# Patient Record
Sex: FEMALE | Race: WHITE | NOT HISPANIC OR LATINO | Employment: UNEMPLOYED | URBAN - METROPOLITAN AREA
[De-identification: names, ages, dates, MRNs, and addresses within clinical notes are randomized per-mention and may not be internally consistent; named-entity substitution may affect disease eponyms.]

---

## 2018-09-28 ENCOUNTER — OFFICE VISIT CONVERTED (OUTPATIENT)
Dept: UROLOGY | Facility: CLINIC | Age: 53
End: 2018-09-28
Attending: NURSE PRACTITIONER

## 2018-10-19 ENCOUNTER — OFFICE VISIT CONVERTED (OUTPATIENT)
Dept: UROLOGY | Facility: CLINIC | Age: 53
End: 2018-10-19
Attending: UROLOGY

## 2019-10-30 ENCOUNTER — HOSPITAL ENCOUNTER (OUTPATIENT)
Dept: SLEEP MEDICINE | Facility: HOSPITAL | Age: 54
Discharge: HOME | End: 2019-10-30
Attending: PSYCHIATRY & NEUROLOGY

## 2021-05-16 VITALS
BODY MASS INDEX: 35.85 KG/M2 | TEMPERATURE: 98.3 F | DIASTOLIC BLOOD PRESSURE: 84 MMHG | HEIGHT: 64 IN | HEART RATE: 81 BPM | SYSTOLIC BLOOD PRESSURE: 153 MMHG | WEIGHT: 210 LBS

## 2021-05-16 VITALS
DIASTOLIC BLOOD PRESSURE: 81 MMHG | BODY MASS INDEX: 38.41 KG/M2 | SYSTOLIC BLOOD PRESSURE: 136 MMHG | HEIGHT: 64 IN | HEART RATE: 86 BPM | TEMPERATURE: 98.4 F | WEIGHT: 225 LBS

## 2021-05-24 ENCOUNTER — CONVERSION ENCOUNTER (OUTPATIENT)
Dept: UROLOGY | Facility: CLINIC | Age: 56
End: 2021-05-24

## 2021-05-24 ENCOUNTER — OFFICE VISIT CONVERTED (OUTPATIENT)
Dept: UROLOGY | Facility: CLINIC | Age: 56
End: 2021-05-24
Attending: NURSE PRACTITIONER

## 2021-05-24 ENCOUNTER — HOSPITAL ENCOUNTER (OUTPATIENT)
Dept: UROLOGY | Facility: CLINIC | Age: 56
Discharge: HOME | End: 2021-05-24
Attending: NURSE PRACTITIONER

## 2021-05-24 LAB
BILIRUB UR QL STRIP: NEGATIVE
COLOR UR: YELLOW
CONV BACTERIA IN URINE MICRO: 0
CONV CALCIUM OXALATE CRYSTALS /HPF IN URINE SEDIMENT BY MICROSCOPY: NORMAL
CONV CLARITY OF URINE: CLEAR
CONV PROTEIN IN URINE BY AUTOMATED TEST STRIP: NEGATIVE
CONV UROBILINOGEN IN URINE BY AUTOMATED TEST STRIP: NORMAL
GLUCOSE UR QL: NEGATIVE
HGB UR QL STRIP: NORMAL
KETONES UR QL STRIP: NEGATIVE
LEUKOCYTE ESTERASE UR QL STRIP: NEGATIVE
NITRITE UR QL STRIP: NEGATIVE
PH UR STRIP.AUTO: 5 [PH]
RBC #/AREA URNS HPF: 0 /[HPF]
RENAL EPI CELLS #/AREA URNS HPF: 0 /[HPF]
SP GR UR: 1.01
SQUAMOUS SPT QL MICRO: 0
WBC #/AREA URNS HPF: 0 /[HPF]

## 2021-05-26 LAB — BACTERIA UR CULT: NORMAL

## 2021-06-05 NOTE — PROGRESS NOTES
Progress Note      Patient Name: Marii Schuster   Patient ID: [id number]   Sex: Female   YOB: 1965        Visit Date: May 24, 2021    Provider: DAMIEN Alcocer   Location: [facility]   Location Address: [address] [place] [address], [address] [place] [address]  [id number]   Location Phone: [phone]          Chief Complaint  · urethral discomfort and supra pubic pain over weekend      History Of Present Illness  The patient is a 56 year old female , who was last seen office 2018. She was diagnosed with urethral prolapse and meatal stenosis. Recommend cysto with urethral dilatation and she declined. States sometimes when she bends forward will feel pain in the vaginal area. She is due to begin her menstrual cycle. Did notice more discomfort after camping and was consuming large amount of soft drinks. It has subsided since then.       Past Medical History  Hyperlipidemia; Hypertension; Hypothyroidism; Renal stone         Past Surgical History  Gallbladder removal; Kidney Stone Surgery, Unspecified         Medication List  Fish Oil 1,000 mg (120 mg-180 mg) oral capsule; hydrochlorothiazide 12.5 mg oral capsule; levothyroxine 50 mcg oral tablet; losartan-hydrochlorothiazide 50-12.5 mg oral tablet; nystatin 100,000 unit/gram topical cream; Pepcid 20 mg oral tablet         Allergy List  Codeine Sulfate; Egg allergy         Family Medical History  DM Type II; Heart Disease; Colon Cancer         Social History  Alcohol (Former); Tobacco (Former)         Review of Systems  · Constitutional  o Denies  o : fever, headache, chills  · Eyes  o Denies  o : eye pain, double vision, blurred vision  · HENT  o Denies  o : sinus problems, sore throat, ear infection  · Cardiovascular  o Denies  o : chest pain, high blood pressure,   · Respiratory  o Denies  o : shortness of breath, wheezing, frequent cough  · Gastrointestinal  o Denies  o : nausea, vomiting, heartburn, indigestion, abdominal  pain  · Genitourinary  o Denies  o : urgency, frequency, urinary retention,   · Integument  o * See HPI  · Neurologic  o Denies  o : tingling or numbness, tremors, dizzy spells  · Musculoskeletal  o Denies  o : joint pain, neck pain, back pain  · Endocrine  o Denies  o : cold intolerance, heat intolerance, tired, excessive thirst, sluggish  · Psychiatric  o Admits  o : feels satisfied with life  o Denies  o : severe depression, concerns with hurting themselves  · Heme-Lymph  o Denies  o : swollen glands, blood clotting problems  · Allergic-Immunologic  o Denies  o : sinus allergy symptoms, hay fever      Vitals  Date Time BP Position Site L\R Cuff Size HR RR TEMP (F) WT  HT  BMI kg/m2 BSA m2 O2 Sat FR L/min FiO2        05/24/2021 03:49 /75 Sitting    104 - R  98                 Physical Examination  · Constitutional  o Appearance  o : Well nourished, well developed patient in no acute distress. Ambulating without difficulty.  · Respiratory  o Respiratory Effort  o : breathing unlabored  o Inspection of Chest  o : normal appearance, no retractions  o Auscultation of Lungs  o : normal breath sounds throughout  · Cardiovascular  o Heart  o :   § Auscultation of Heart  § : regular rate and rhythm, no murmurs, gallops or rubs  o Peripheral Vascular System  o : No abnormalities  · Gastrointestinal  o Abdominal Examination  o : Scaphoid abdomen which is non-tender to palpation with normal tone and without rigidity or guarding. Normal bowel sounds. No masses present.  o Liver and spleen  o : no abnormalities  o Hernias  o : absent   · Genitourinary  o External Genitalia  o : erythema with small amount of discharge   o Urethra  o : mildly inflamed, mild urethrocele.   o Bladder  o : Non-tender to palpation without distension.  o Cervix  o : nontender to palpation, no bleeding present  o Uterus  o : nontender to palpation  o Adnexa  o : No adnexal tenderness present, no adnexal masses present, ovaries not  palpable  · Lymphatic  o Groin  o : No lymphadenopathy present  · Skin and Subcutaneous Tissue  o General Inspection  o : no lesions present, no areas of discoloration, skin turgor normal, texture normal  · Neurologic  o Mental Status Examination  o : grossly oriented to person, place and time  o Gait and Station  o : normal gait, able to stand without difficulty  · Psychiatric  o Mood and Affect  o : mood normal, affect appropriate          Results  · In-Office Procedures  o Lab procedure  § Automated dipstick urinalysis with microscopy (02702)   § Color Ur: Yellow   § Clarity Ur: Clear   § Glucose Ur Ql Strip: Negative   § Bilirub Ur Ql Strip: Negative   § Ketones Ur Ql Strip: Negative   § Sp Gr Ur Qn: 1.010   § Hgb Ur Ql Strip: Trace-Lysed   § pH Ur-LsCnc: 5.0   § Prot Ur Ql Strip: Negative   § Urobilinogen Ur Strip-mCnc: 0.2 E.U./dL   § Nitrite Ur Ql Strip: Negative   § WBC Est Ur Ql Strip: Negative   § RBC UrnS Qn HPF: 0   § WBC UrnS Qn HPF: 0   § Bacteria UrnS Qn HPF: 0   § Crystals UrnS Qn HPF: amorphous sediment   § Epithelial Cells (non renal): 0 /HPF  § Epithelial Cells (renal): 0       Assessment  · Vaginitis     616.10/N76.0  · UTI (urinary tract infection)     599.0/N39.0  · 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 patient has some erythema and scant white discharge inner labia and mild inflammation with small urethral prolapse. to apply  nystatin cream as instructed and Diflucan x 2 days. pyridium prn and recommend stop consuming carbonated soft drinks and caffeine. increase water.  provided with printed rx pea size estradiol cream prn to meatus and recommend cysto if does not improve to evaluate for ic and urethral stricture. she may also need urethral dilatation. routine visit 6 months. If no improvement, follow up 3 weeks to recheck and schedule cysto.             Electronically Signed by: DAMIEN Alcocer -Author on May 24, 2021 05:28:54 PM

## 2021-07-15 VITALS — DIASTOLIC BLOOD PRESSURE: 75 MMHG | HEART RATE: 104 BPM | TEMPERATURE: 98 F | SYSTOLIC BLOOD PRESSURE: 142 MMHG

## 2021-08-23 ENCOUNTER — APPOINTMENT (OUTPATIENT)
Dept: CT IMAGING | Facility: HOSPITAL | Age: 56
End: 2021-08-23

## 2021-08-23 ENCOUNTER — APPOINTMENT (OUTPATIENT)
Dept: GENERAL RADIOLOGY | Facility: HOSPITAL | Age: 56
End: 2021-08-23

## 2021-08-23 ENCOUNTER — HOSPITAL ENCOUNTER (EMERGENCY)
Facility: HOSPITAL | Age: 56
Discharge: HOME | End: 2021-08-23
Attending: EMERGENCY MEDICINE | Admitting: EMERGENCY MEDICINE

## 2021-08-23 ENCOUNTER — TELEPHONE (OUTPATIENT)
Dept: CARDIOLOGY | Facility: CLINIC | Age: 56
End: 2021-08-23

## 2021-08-23 VITALS
RESPIRATION RATE: 18 BRPM | HEIGHT: 64 IN | TEMPERATURE: 98.6 F | SYSTOLIC BLOOD PRESSURE: 167 MMHG | BODY MASS INDEX: 37.56 KG/M2 | WEIGHT: 220 LBS | OXYGEN SATURATION: 92 % | HEART RATE: 76 BPM | DIASTOLIC BLOOD PRESSURE: 93 MMHG

## 2021-08-23 DIAGNOSIS — E27.8 LEFT ADRENAL MASS (HCC): ICD-10-CM

## 2021-08-23 DIAGNOSIS — R59.0 HILAR ADENOPATHY: ICD-10-CM

## 2021-08-23 DIAGNOSIS — R07.89 ATYPICAL CHEST PAIN: Primary | ICD-10-CM

## 2021-08-23 DIAGNOSIS — R91.8 PULMONARY NODULES: ICD-10-CM

## 2021-08-23 DIAGNOSIS — K21.00 GASTROESOPHAGEAL REFLUX DISEASE WITH ESOPHAGITIS WITHOUT HEMORRHAGE: ICD-10-CM

## 2021-08-23 LAB
ALBUMIN SERPL-MCNC: 4.3 G/DL (ref 3.5–5.2)
ALBUMIN/GLOB SERPL: 1.2 G/DL
ALP SERPL-CCNC: 72 U/L (ref 39–117)
ALT SERPL W P-5'-P-CCNC: 23 U/L (ref 1–33)
ANION GAP SERPL CALCULATED.3IONS-SCNC: 11.7 MMOL/L (ref 5–15)
AST SERPL-CCNC: 27 U/L (ref 1–32)
BASOPHILS # BLD AUTO: 0.02 10*3/MM3 (ref 0–0.2)
BASOPHILS NFR BLD AUTO: 0.3 % (ref 0–1.5)
BILIRUB SERPL-MCNC: 1.1 MG/DL (ref 0–1.2)
BUN SERPL-MCNC: 9 MG/DL (ref 6–20)
BUN/CREAT SERPL: 16.4 (ref 7–25)
CALCIUM SPEC-SCNC: 9.3 MG/DL (ref 8.6–10.5)
CHLORIDE SERPL-SCNC: 99 MMOL/L (ref 98–107)
CO2 SERPL-SCNC: 27.3 MMOL/L (ref 22–29)
CREAT SERPL-MCNC: 0.55 MG/DL (ref 0.57–1)
DEPRECATED RDW RBC AUTO: 43.9 FL (ref 37–54)
EOSINOPHIL # BLD AUTO: 0.16 10*3/MM3 (ref 0–0.4)
EOSINOPHIL NFR BLD AUTO: 2.1 % (ref 0.3–6.2)
ERYTHROCYTE [DISTWIDTH] IN BLOOD BY AUTOMATED COUNT: 15.4 % (ref 12.3–15.4)
GFR SERPL CREATININE-BSD FRML MDRD: 115 ML/MIN/1.73
GLOBULIN UR ELPH-MCNC: 3.5 GM/DL
GLUCOSE SERPL-MCNC: 130 MG/DL (ref 65–99)
HCG SERPL QL: NEGATIVE
HCT VFR BLD AUTO: 43.7 % (ref 34–46.6)
HGB BLD-MCNC: 14.2 G/DL (ref 12–15.9)
HOLD SPECIMEN: NORMAL
IMM GRANULOCYTES # BLD AUTO: 0.02 10*3/MM3 (ref 0–0.05)
IMM GRANULOCYTES NFR BLD AUTO: 0.3 % (ref 0–0.5)
LIPASE SERPL-CCNC: 9 U/L (ref 13–60)
LYMPHOCYTES # BLD AUTO: 1.96 10*3/MM3 (ref 0.7–3.1)
LYMPHOCYTES NFR BLD AUTO: 25.7 % (ref 19.6–45.3)
MCH RBC QN AUTO: 26.2 PG (ref 26.6–33)
MCHC RBC AUTO-ENTMCNC: 32.5 G/DL (ref 31.5–35.7)
MCV RBC AUTO: 80.5 FL (ref 79–97)
MONOCYTES # BLD AUTO: 0.52 10*3/MM3 (ref 0.1–0.9)
MONOCYTES NFR BLD AUTO: 6.8 % (ref 5–12)
NEUTROPHILS NFR BLD AUTO: 4.96 10*3/MM3 (ref 1.7–7)
NEUTROPHILS NFR BLD AUTO: 64.8 % (ref 42.7–76)
NRBC BLD AUTO-RTO: 0 /100 WBC (ref 0–0.2)
NT-PROBNP SERPL-MCNC: 89.4 PG/ML (ref 0–900)
PLATELET # BLD AUTO: 268 10*3/MM3 (ref 140–450)
PMV BLD AUTO: 9.3 FL (ref 6–12)
POTASSIUM SERPL-SCNC: 3.5 MMOL/L (ref 3.5–5.2)
PROT SERPL-MCNC: 7.8 G/DL (ref 6–8.5)
QT INTERVAL: 401 MS
RBC # BLD AUTO: 5.43 10*6/MM3 (ref 3.77–5.28)
SODIUM SERPL-SCNC: 138 MMOL/L (ref 136–145)
TROPONIN T SERPL-MCNC: <0.01 NG/ML (ref 0–0.03)
TROPONIN T SERPL-MCNC: <0.01 NG/ML (ref 0–0.03)
WBC # BLD AUTO: 7.64 10*3/MM3 (ref 3.4–10.8)
WHOLE BLOOD HOLD SPECIMEN: NORMAL
WHOLE BLOOD HOLD SPECIMEN: NORMAL

## 2021-08-23 PROCEDURE — 93010 ELECTROCARDIOGRAM REPORT: CPT | Performed by: INTERNAL MEDICINE

## 2021-08-23 PROCEDURE — 71046 X-RAY EXAM CHEST 2 VIEWS: CPT

## 2021-08-23 PROCEDURE — 0 IOPAMIDOL PER 1 ML: Performed by: EMERGENCY MEDICINE

## 2021-08-23 PROCEDURE — 93005 ELECTROCARDIOGRAM TRACING: CPT

## 2021-08-23 PROCEDURE — 99284 EMERGENCY DEPT VISIT MOD MDM: CPT

## 2021-08-23 PROCEDURE — 93005 ELECTROCARDIOGRAM TRACING: CPT | Performed by: EMERGENCY MEDICINE

## 2021-08-23 PROCEDURE — 80053 COMPREHEN METABOLIC PANEL: CPT | Performed by: EMERGENCY MEDICINE

## 2021-08-23 PROCEDURE — 84703 CHORIONIC GONADOTROPIN ASSAY: CPT

## 2021-08-23 PROCEDURE — 74174 CTA ABD&PLVS W/CONTRAST: CPT

## 2021-08-23 PROCEDURE — 84484 ASSAY OF TROPONIN QUANT: CPT | Performed by: EMERGENCY MEDICINE

## 2021-08-23 PROCEDURE — 83690 ASSAY OF LIPASE: CPT | Performed by: EMERGENCY MEDICINE

## 2021-08-23 PROCEDURE — 85025 COMPLETE CBC W/AUTO DIFF WBC: CPT

## 2021-08-23 PROCEDURE — 83880 ASSAY OF NATRIURETIC PEPTIDE: CPT | Performed by: EMERGENCY MEDICINE

## 2021-08-23 PROCEDURE — 71275 CT ANGIOGRAPHY CHEST: CPT

## 2021-08-23 RX ORDER — SODIUM CHLORIDE 0.9 % (FLUSH) 0.9 %
10 SYRINGE (ML) INJECTION AS NEEDED
Status: DISCONTINUED | OUTPATIENT
Start: 2021-08-23 | End: 2021-08-23 | Stop reason: HOSPADM

## 2021-08-23 RX ORDER — ASPIRIN 325 MG
325 TABLET ORAL ONCE
Status: DISCONTINUED | OUTPATIENT
Start: 2021-08-23 | End: 2021-08-23 | Stop reason: HOSPADM

## 2021-08-23 RX ORDER — OMEPRAZOLE 40 MG/1
40 CAPSULE, DELAYED RELEASE ORAL DAILY
Qty: 30 CAPSULE | Refills: 1 | Status: SHIPPED | OUTPATIENT
Start: 2021-08-23

## 2021-08-23 RX ORDER — SUCRALFATE 1 G/1
1 TABLET ORAL
Qty: 120 TABLET | Refills: 0 | Status: SHIPPED | OUTPATIENT
Start: 2021-08-23

## 2021-08-23 RX ADMIN — IOPAMIDOL 95 ML: 755 INJECTION, SOLUTION INTRAVENOUS at 16:18

## 2021-08-23 NOTE — ED NOTES
Pt to ED from home c/o generalized upper back pain with intermittent chest discomfort x3 days. Pt c/o belching and nausea. Pt denies dizziness or SOA.      Pt was wearing mask throughout entire encounter. I wore proper personal protective equipment throughout patient care. Hand hygiene was performed before and after encounter.     Riya Joshi RN  08/23/21 1037       Riya Joshi RN  08/23/21 1112

## 2021-08-23 NOTE — TELEPHONE ENCOUNTER
"0820  Patient's  called to report that \"several years ago, Dr. Skinner did a heart cath on my wife and it was normal.  Now she is having chest pain mid chest and into back.\"  She has no other symptoms and the pain started this past week.  She has not been seen here since the cardiac cath in 2014.  I requested that she contact her PCP in [place] and to see if they could see her today.  Her  told me her NP is out of the office today and she does not want to go to the ER since there is so much Covid going around.  I explained to him that someone should be covering for the NP and they should start local since they live a distance from here and she would benefit from being seen there.  He verbalized understanding, but asked if I could send a message to see if Dr. Skinner could see her.  Thanks,  CHANELLE Soriano RN  "

## 2021-08-23 NOTE — ED PROVIDER NOTES
EMERGENCY DEPARTMENT ENCOUNTER    Room Number:  28/28  Date seen:  8/23/2021  PCP: Gloria Sharma APRN  Historian: Patient      HPI:  Chief Complaint: Chest and abdominal pain  A complete HPI/ROS/PMH/PSH/SH/FH are unobtainable due to: Nothing  Context: Marii Schuster is a 56 y.o. female who presents to the ED c/o chest abdominal pain that is been mostly constant for the last 3 days.  She reports that symptoms seem to start on Saturday night after she had eaten Mexican food.  She has had pain that is localized to the lower portion of her sternum.  She occasionally gets radiation of the pain to her back.  She has had associated nausea but no vomiting.  She also reports frequent belching.  She tried a combination of baking soda and lime juice which not really help her symptoms but made her belch more.  She does have a previous history of GERD and was previously on Nexium but then was switched to Pepcid which she takes now.  She denies shortness of breath.  She denies black or bloody stool.  She has had no diarrhea.            PAST MEDICAL HISTORY  Active Ambulatory Problems     Diagnosis Date Noted   • No Active Ambulatory Problems     Resolved Ambulatory Problems     Diagnosis Date Noted   • No Resolved Ambulatory Problems     Past Medical History:   Diagnosis Date   • Hashimoto's disease    • Hyperlipidemia    • Hypertension          PAST SURGICAL HISTORY  Past Surgical History:   Procedure Laterality Date   • CHOLECYSTECTOMY           FAMILY HISTORY  History reviewed. No pertinent family history.      SOCIAL HISTORY  Social History     Socioeconomic History   • Marital status:      Spouse name: Not on file   • Number of children: Not on file   • Years of education: Not on file   • Highest education level: Not on file   Tobacco Use   • Smoking status: Former Smoker   Substance and Sexual Activity   • Alcohol use: Not Currently   • Drug use: Not Currently          ALLERGIES  Codeine        REVIEW OF SYSTEMS  Review of Systems   Review of all 14 systems is negative other than stated in the HPI above.      PHYSICAL EXAM  ED Triage Vitals   Temp Heart Rate Resp BP SpO2   08/23/21 1021 08/23/21 1021 08/23/21 1021 08/23/21 1021 08/23/21 1021   98.6 °F (37 °C) 96 16 (!) 179/106 98 %      Temp src Heart Rate Source Patient Position BP Location FiO2 (%)   -- 08/23/21 1327 -- -- --    Monitor            GENERAL: Awake and alert, no acute distress  HENT: nares patent  EYES: no scleral icterus  CV: regular rhythm, normal rate  RESPIRATORY: normal effort, lungs clear auscultation bilaterally  ABDOMEN: soft, mild subxiphoid tenderness without rebound or guarding.  Abdomen is nondistended.  There is no other abdominal tenderness present.  MUSCULOSKELETAL: no deformity  NEURO: alert, moves all extremities, follows commands  PSYCH:  calm, cooperative  SKIN: warm, dry    Vital signs and nursing notes reviewed.          LAB RESULTS  Recent Results (from the past 24 hour(s))   ECG 12 Lead    Collection Time: 08/23/21 10:29 AM   Result Value Ref Range    QT Interval 401 ms   Comprehensive Metabolic Panel    Collection Time: 08/23/21 12:20 PM    Specimen: Blood   Result Value Ref Range    Glucose 130 (H) 65 - 99 mg/dL    BUN 9 6 - 20 mg/dL    Creatinine 0.55 (L) 0.57 - 1.00 mg/dL    Sodium 138 136 - 145 mmol/L    Potassium 3.5 3.5 - 5.2 mmol/L    Chloride 99 98 - 107 mmol/L    CO2 27.3 22.0 - 29.0 mmol/L    Calcium 9.3 8.6 - 10.5 mg/dL    Total Protein 7.8 6.0 - 8.5 g/dL    Albumin 4.30 3.50 - 5.20 g/dL    ALT (SGPT) 23 1 - 33 U/L    AST (SGOT) 27 1 - 32 U/L    Alkaline Phosphatase 72 39 - 117 U/L    Total Bilirubin 1.1 0.0 - 1.2 mg/dL    eGFR Non African Amer 115 >60 mL/min/1.73    Globulin 3.5 gm/dL    A/G Ratio 1.2 g/dL    BUN/Creatinine Ratio 16.4 7.0 - 25.0    Anion Gap 11.7 5.0 - 15.0 mmol/L   Troponin    Collection Time: 08/23/21 12:20 PM    Specimen: Blood   Result Value Ref Range     Troponin T <0.010 0.000 - 0.030 ng/mL   hCG, Serum, Qualitative    Collection Time: 08/23/21 12:20 PM    Specimen: Blood   Result Value Ref Range    HCG Qualitative Negative Negative   BNP    Collection Time: 08/23/21 12:20 PM    Specimen: Blood   Result Value Ref Range    proBNP 89.4 0.0 - 900.0 pg/mL   Green Top (Gel)    Collection Time: 08/23/21 12:20 PM   Result Value Ref Range    Extra Tube Hold for add-ons.    Lavender Top    Collection Time: 08/23/21 12:20 PM   Result Value Ref Range    Extra Tube hold for add-on    CBC Auto Differential    Collection Time: 08/23/21 12:20 PM    Specimen: Blood   Result Value Ref Range    WBC 7.64 3.40 - 10.80 10*3/mm3    RBC 5.43 (H) 3.77 - 5.28 10*6/mm3    Hemoglobin 14.2 12.0 - 15.9 g/dL    Hematocrit 43.7 34.0 - 46.6 %    MCV 80.5 79.0 - 97.0 fL    MCH 26.2 (L) 26.6 - 33.0 pg    MCHC 32.5 31.5 - 35.7 g/dL    RDW 15.4 12.3 - 15.4 %    RDW-SD 43.9 37.0 - 54.0 fl    MPV 9.3 6.0 - 12.0 fL    Platelets 268 140 - 450 10*3/mm3    Neutrophil % 64.8 42.7 - 76.0 %    Lymphocyte % 25.7 19.6 - 45.3 %    Monocyte % 6.8 5.0 - 12.0 %    Eosinophil % 2.1 0.3 - 6.2 %    Basophil % 0.3 0.0 - 1.5 %    Immature Grans % 0.3 0.0 - 0.5 %    Neutrophils, Absolute 4.96 1.70 - 7.00 10*3/mm3    Lymphocytes, Absolute 1.96 0.70 - 3.10 10*3/mm3    Monocytes, Absolute 0.52 0.10 - 0.90 10*3/mm3    Eosinophils, Absolute 0.16 0.00 - 0.40 10*3/mm3    Basophils, Absolute 0.02 0.00 - 0.20 10*3/mm3    Immature Grans, Absolute 0.02 0.00 - 0.05 10*3/mm3    nRBC 0.0 0.0 - 0.2 /100 WBC   Lipase    Collection Time: 08/23/21 12:20 PM    Specimen: Blood   Result Value Ref Range    Lipase 9 (L) 13 - 60 U/L   Light Blue Top    Collection Time: 08/23/21 12:21 PM   Result Value Ref Range    Extra Tube hold for add-on    Troponin    Collection Time: 08/23/21  3:11 PM    Specimen: Blood   Result Value Ref Range    Troponin T <0.010 0.000 - 0.030 ng/mL       Ordered the above labs and reviewed the  results.        RADIOLOGY  XR Chest 2 View    Result Date: 8/23/2021  CHEST: 2 VIEWS  HISTORY: Anterior chest pain for 3 days  COMPARISON: Two-view chest 11/04/2014.  FINDINGS:Cardiomediastinal silhouette is normal. Lungs are clear and there is no evidence for pulmonary edema or pleural effusion or infiltrate. There is mild endplate spur formation within the thoracic spine.      No acute disease  This report was finalized on 8/23/2021 11:45 AM by Dr. Alin Lawson M.D.      CT Angiogram Chest, CT Angiogram Abdomen Pelvis    Result Date: 8/23/2021  CT ANGIOGRAM OF THE CHEST, ABDOMEN AND PELVIS. MULTIPLE CORONAL, SAGITTAL, AND 3-D RECONSTRUCTIONS.  HISTORY: Epigastric pain radiating to the back, exclude arterial dissection  TECHNIQUE: Radiation dose reduction techniques were utilized, including automated exposure control and exposure modulation based on body size. CT angiogram of the chest, abdomen and pelvis was performed. Multiple coronal, sagittal, and 3-D reconstruction images were obtained.  COMPARISON:None  FINDINGS:  Chest: Borderline enlarged bilateral hilar nodes measure 1 cm in short axis dimension. There is an enlarged precarinal node measuring 1.1 cm in short axis dimension. There is no axillary adenopathy by size criteria. No significant stenosis is present within the origins of the great vessels arising off the aortic arch.  There is no significant pericardial effusion. The thoracic aorta is within normal limits for size. No aortic dissection flap is visualized.  No pulmonary consolidation, pleural effusion or pneumothorax. Subcentimeter pulmonary nodule within the right middle and left upper lobes are present.  Abdomen and pelvis: 2 left-sided renal arteries arise off the aorta and single right-sided renal arteries arise off the aorta. No significant stenosis is present within the origins of the main renal, superior mesenteric or celiac arteries. The left hepatic artery is replaced off the left  gastric artery.  Findings suggestive of hepatic steatosis. The gallbladder is surgically absent. Spleen is mildly enlarged, measuring 13.7 cm in length. There is bilateral nephrolithiasis measuring up to approximately 0.3 to 0.4 cm on the left and 0.5 to 0.6 cm on the right. No hydronephrosis is present.  No abdominopelvic adenopathy is present by size criteria. The appendix is unremarkable. The bladder is decompressed and cannot be evaluated.  There is no significant free intraperitoneal fluid or air. There are no findings of small bowel obstruction.  While this examination is not tailored for detailed evaluation of the abdominal viscera due to contrast timing, no gross abnormality is visualized within the pancreas or spleen.  There is a 1.3 cm left adrenal lesion. Septated right adnexal cystic lesion appears to represent 2 ovarian cysts which in aggregate measure up to 4.1 cm.  Bone windows: No suspicious lytic or blastic osseous lesions are present.      1.  No findings of aortic dissection. 2.  Mildly enlarged bilateral hilar and mediastinal adenopathy as detailed above. Findings are nonspecific and follow-up with chest CT in 3 months is recommended to ensure stability and/or resolution and exclude the small possibility of neoplasm. 3.  1.3 cm indeterminate left adrenal lesion. If the patient has a known malignancy, further evaluation with noncontrast CT abdomen without intravenous contrast is recommended.  In the absence of known malignancy, findings are likely benign and follow-up with CT abdomen in 12 months can be considered. 4.  A few sub-6 mm pulmonary nodules bilaterally. If this patient is at increased risk for lung cancer, follow-up chest CT in 12 months can be considered to ensure stability. If this patient is not at increased risk for lung cancer, no further follow-up is necessary per Fleischner criteria. 5.  Findings suggestive of hepatic steatosis. Bilateral nephrolithiasis without hydronephrosis.  Mild splenomegaly. 6.  Other findings as above.  This report was finalized on 8/23/2021 5:24 PM by Dr. Art Fraire M.D.        Ordered the above noted radiological studies. Reviewed by me in PACS.            PROCEDURES  Procedures          MEDICATIONS GIVEN IN ER  Medications   sodium chloride 0.9 % flush 10 mL (has no administration in time range)   aspirin tablet 325 mg (has no administration in time range)   iopamidol (ISOVUE-370) 76 % injection 100 mL (95 mL Intravenous Given by Other 8/23/21 1618)                   MEDICAL DECISION MAKING, PROGRESS, and CONSULTS    All labs have been independently reviewed by me.  All radiology studies have been reviewed by me and discussed with radiologist dictating the report.   EKG's independently viewed and interpreted by me.  Discussion below represents my analysis of pertinent findings related to patient's condition, differential diagnosis, treatment plan and final disposition.      Differential diagnosis includes but is not limited to:  Esophagitis  Hiatal hernia  Gastritis  Aortic dissection  Pancreatitis      ED Course as of Aug 23 1924   Mon Aug 23, 2021   1209 Chest x-ray reviewed in PACS.  There is no acute infiltrate, no widening of the mediastinum.    [JR]   1354 Troponin T: <0.010 [JR]   1354 proBNP: 89.4 [JR]   1354 HCG Qualitative: Negative [JR]   1354 WBC: 7.64 [JR]   1354 Hemoglobin: 14.2 [JR]   1354 ALT (SGPT): 23 [JR]   1354 AST (SGOT): 27 [JR]   1354 Creatinine(!): 0.55 [JR]   1354 Glucose(!): 130 [JR]   1403 Medical record review: I reviewed cath from 11/2014 which showed normal coronary arteries.      [JR]   1624 CT chest abdomen pelvis reviewed in PACS.  I see no evidence of aortic dissection or aneurysm.    [JR]   1730 I reviewed the CT chest and abdomen report.  There are multiple incidental findings including mediastinal and hilar adenopathy, pulmonary nodules, left adrenal lesion.    [JR]   1730 Patient symptoms are most likely secondary to  esophagitis.  She does have a history of GERD and has been on Pepcid.  I recommended changing her Pepcid to a PPI and she will also be given Carafate to use as needed.  Her EKG is without ischemic changes, cardiac troponin is normal.  There is no evidence of aortic dissection on CTA chest abdomen pelvis.  HEART score 2.    [JR]      ED Course User Index  [JR] Carter Montgomery MD     Patient presents with epigastric and subxiphoid pain with some radiation to her back.  Her serum lipase is normal and there is no radiographic evidence on CTA of pancreatitis.  Additionally there is no evidence of aortic dissection.  LFTs are normal.  She has had no hematemesis or melena to warrant urgent EGD.  I explained that she most likely has a component of esophagitis or gastritis causing her symptoms and I recommended a daily PPI as well as Carafate as needed.  Patient was also informed of the incidental findings today and the need for repeat chest CT and abdominal CT as outpatient in 6 to 12 months.         I wore a mask, face shield, and gloves during this patient encounter.  Patient also wearing a surgical mask.  Hand hygeine performed before and after seeing the patient.    DIAGNOSIS  Final diagnoses:   Atypical chest pain   Gastroesophageal reflux disease with esophagitis without hemorrhage   Pulmonary nodules   Hilar adenopathy   Left adrenal mass (CMS/HCC)         DISPOSITION  DISCHARGE    Patient discharged in stable condition.    Reviewed implications of results, diagnosis, meds, responsibility to follow up, warning signs and symptoms of possible worsening, potential complications and reasons to return to ER.    Patient/Family voiced understanding of above instructions.    Discussed plan for discharge, as there is no emergent indication for admission. Patient referred to primary care provider for BP management due to today's BP. Pt/family is agreeable and understands need for follow up and repeat testing.  Pt is  aware that discharge does not mean that nothing is wrong but it indicates no emergency is present that requires admission and they must continue care with follow-up as given below or physician of their choice.     FOLLOW-UP  Gloria Sharma, APRN  [address] [place] [address] [phone]    Schedule an appointment as soon as possible for a visit            Medication List      New Prescriptions    omeprazole 40 MG capsule  Commonly known as: priLOSEC  Take 1 capsule by mouth Daily.     sucralfate 1 g tablet  Commonly known as: CARAFATE  Take 1 tablet by mouth 4 (Four) Times a Day Before Meals & at Bedtime As Needed (abdominal pain).           Where to Get Your Medications      These medications were sent to [id number] - [place], [place] - [address] AT [facility] OF [address] [facility] - [phone]  - [phone] FX  [address], [facility] [address]    Phone: [phone]   · omeprazole 40 MG capsule  · sucralfate 1 g tablet                   Latest Documented Vital Signs:  As of 19:24 EDT  BP- 167/93 HR- 76 Temp- 98.6 °F (37 °C) O2 sat- 92%        --    Please note that portions of this were completed with a voice recognition program.            Carter Montgomery MD  08/23/21 1928

## 2021-08-23 NOTE — TELEPHONE ENCOUNTER
This is a new pt. I have called and advised ER, they are actually already there.  I have advised they call back after their evaluation and schedule a fu to get re-established in the office  Thanks  Saumya Ferris RN  Triage nurse

## 2021-11-01 ENCOUNTER — TRANSCRIBE ORDERS (OUTPATIENT)
Dept: ADMINISTRATIVE | Facility: HOSPITAL | Age: 56
End: 2021-11-01

## 2021-11-01 DIAGNOSIS — R00.2 PALPITATIONS: ICD-10-CM

## 2021-11-01 DIAGNOSIS — R93.89 NONSPECIFIC ABNORMAL FINDINGS ON DIAGNOSTIC IMAGING: Primary | ICD-10-CM

## 2021-11-26 ENCOUNTER — APPOINTMENT (OUTPATIENT)
Dept: CT IMAGING | Facility: HOSPITAL | Age: 56
End: 2021-11-26

## 2021-11-26 ENCOUNTER — APPOINTMENT (OUTPATIENT)
Dept: CARDIOLOGY | Facility: HOSPITAL | Age: 56
End: 2021-11-26

## 2021-12-28 ENCOUNTER — HOSPITAL ENCOUNTER (OUTPATIENT)
Dept: CT IMAGING | Facility: HOSPITAL | Age: 56
Discharge: HOME | End: 2021-12-28
Admitting: NURSE PRACTITIONER

## 2021-12-28 ENCOUNTER — HOSPITAL ENCOUNTER (OUTPATIENT)
Dept: CARDIOLOGY | Facility: HOSPITAL | Age: 56
Discharge: HOME | End: 2021-12-28
Admitting: NURSE PRACTITIONER

## 2021-12-28 DIAGNOSIS — R93.89 NONSPECIFIC ABNORMAL FINDINGS ON DIAGNOSTIC IMAGING: ICD-10-CM

## 2021-12-28 DIAGNOSIS — R00.2 PALPITATIONS: ICD-10-CM

## 2021-12-28 LAB — CREAT BLDA-MCNC: 0.5 MG/DL (ref 0.6–1.3)

## 2021-12-28 PROCEDURE — 82565 ASSAY OF CREATININE: CPT

## 2021-12-28 PROCEDURE — 25010000002 IOPAMIDOL 61 % SOLUTION: Performed by: NURSE PRACTITIONER

## 2021-12-28 PROCEDURE — 93226 XTRNL ECG REC<48 HR SCAN A/R: CPT

## 2021-12-28 PROCEDURE — 71260 CT THORAX DX C+: CPT

## 2021-12-28 PROCEDURE — 93225 XTRNL ECG REC<48 HRS REC: CPT

## 2021-12-28 RX ADMIN — IOPAMIDOL 80 ML: 612 INJECTION, SOLUTION INTRAVENOUS at 08:06

## 2021-12-30 ENCOUNTER — OFFICE VISIT (OUTPATIENT)
Dept: CARDIOLOGY | Facility: CLINIC | Age: 56
End: 2021-12-30

## 2021-12-30 VITALS
BODY MASS INDEX: 37.08 KG/M2 | HEART RATE: 80 BPM | HEIGHT: 64 IN | DIASTOLIC BLOOD PRESSURE: 80 MMHG | WEIGHT: 217.2 LBS | SYSTOLIC BLOOD PRESSURE: 140 MMHG

## 2021-12-30 DIAGNOSIS — R07.89 CHEST PAIN, ATYPICAL: Primary | ICD-10-CM

## 2021-12-30 DIAGNOSIS — R00.2 PALPITATIONS: ICD-10-CM

## 2021-12-30 DIAGNOSIS — Z82.41 FAMILY HISTORY OF SUDDEN CARDIAC DEATH: ICD-10-CM

## 2021-12-30 PROCEDURE — 93000 ELECTROCARDIOGRAM COMPLETE: CPT | Performed by: INTERNAL MEDICINE

## 2021-12-30 PROCEDURE — 99204 OFFICE O/P NEW MOD 45 MIN: CPT | Performed by: INTERNAL MEDICINE

## 2021-12-30 RX ORDER — LOSARTAN POTASSIUM 50 MG/1
50 TABLET ORAL DAILY
COMMUNITY

## 2021-12-30 RX ORDER — CHLORAL HYDRATE 500 MG
CAPSULE ORAL
COMMUNITY

## 2021-12-30 RX ORDER — LEVOTHYROXINE SODIUM 50 UG/1
50 TABLET ORAL DAILY
COMMUNITY
Start: 2021-10-13

## 2021-12-30 RX ORDER — LANSOPRAZOLE 30 MG/1
30 CAPSULE, DELAYED RELEASE ORAL DAILY
COMMUNITY
Start: 2021-10-25

## 2021-12-30 RX ORDER — ATORVASTATIN CALCIUM 20 MG/1
20 TABLET, FILM COATED ORAL DAILY
COMMUNITY
Start: 2021-12-18 | End: 2022-03-21 | Stop reason: SDUPTHER

## 2021-12-30 RX ORDER — HYDROCHLOROTHIAZIDE 12.5 MG/1
12.5 CAPSULE, GELATIN COATED ORAL DAILY
COMMUNITY

## 2021-12-30 RX ORDER — ASPIRIN 81 MG/1
81 TABLET, CHEWABLE ORAL DAILY
COMMUNITY

## 2021-12-30 NOTE — PROGRESS NOTES
[facility]      Patient Name: Marii Schuster  :1965  Age: 56 y.o.  Encounter Provider:  Rio Ring Jr, MD      Chief Complaint:   Chief Complaint   Patient presents with   • Palpitations         HPI  Marii Schuster is a 56 y.o. female past medical history of hypertension, dyslipidemia, hiatal hernia, NORTH on CPAP, obesity who presents for initial evaluation of chest discomfort.  Patient is struggled on and off with chest pain for over a decade.  In  she had cardiac catheterization showing normal coronary arteries per patient report.  She had an echocardiogram at the same time which showed normal EF.  She has daily sharp episodes of chest pain.  No relationship to physical activity.  Fleeting episodes with no associated nausea, diaphoresis or shortness of air.  She wakes up often with palpitations which last anywhere from seconds to minutes.  She has chronic lower extremity edema and has been diagnosed with venous insufficiency approximately 18 to 24 months ago.  She has a very strong family history of coronary artery disease.  Father diagnosed in his 40s with coronary artery disease and status post CABG.  Fatal MI at age 62.  Brother with heart attack at age 40.  2 other brothers with coronary revascularization.  Sister with nonfatal myocardial infarction at age 60.  She is a former smoker quit in , denies alcohol or illicit drug use.      The following portions of the patient's history were reviewed and updated as appropriate: allergies, current medications, past family history, past medical history, past social history, past surgical history and problem list.      Review of Systems   Constitutional: Negative for chills and fever.   HENT: Negative for hoarse voice and sore throat.    Eyes: Negative for double vision and photophobia.   Cardiovascular: Positive for chest pain, leg swelling and palpitations. Negative for near-syncope, orthopnea, paroxysmal nocturnal  "dyspnea and syncope.   Respiratory: Negative for cough and wheezing.    Skin: Negative for poor wound healing and rash.   Musculoskeletal: Negative for arthritis and joint swelling.   Gastrointestinal: Negative for bloating, abdominal pain, hematemesis and hematochezia.   Neurological: Negative for dizziness and focal weakness.   Psychiatric/Behavioral: Negative for depression and suicidal ideas.       OBJECTIVE:   Vital Signs  Vitals:    12/30/21 0852   BP: 140/80   Pulse: 80     Estimated body mass index is 37.28 kg/m² as calculated from the following:    Height as of this encounter: 162.6 cm (64\").    Weight as of this encounter: 98.5 kg (217 lb 3.2 oz).    Vitals reviewed.   Constitutional:       Appearance: Healthy appearance. Not in distress.   Neck:      Vascular: No JVR. JVD normal.   Pulmonary:      Effort: Pulmonary effort is normal.      Breath sounds: Normal breath sounds. No wheezing. No rhonchi. No rales.   Chest:      Chest wall: Not tender to palpatation.   Cardiovascular:      PMI at left midclavicular line. Normal rate. Regular rhythm. Normal S1. Normal S2.      Murmurs: There is no murmur.      No gallop. No click. No rub.   Pulses:     Intact distal pulses.   Edema:     Peripheral edema absent.   Abdominal:      General: Bowel sounds are normal.      Palpations: Abdomen is soft.      Tenderness: There is no abdominal tenderness.   Musculoskeletal: Normal range of motion.         General: No tenderness. Skin:     General: Skin is warm and dry.   Neurological:      General: No focal deficit present.      Mental Status: Alert and oriented to person, place and time.           ECG 12 Lead    Date/Time: 12/30/2021 9:16 AM  Performed by: Rio Ring Jr., MD  Authorized by: Rio Ring Jr., MD   Comparison: not compared with previous ECG   Previous ECG: no previous ECG available  Rhythm: sinus rhythm    Clinical impression: normal ECG                  ASSESSMENT:     Atypical chest " pain  Palpitations  Family history of premature coronary artery disease  Hiatal hernia  NORTH on CPAP  Hypertension  Dyslipidemia    PLAN OF CARE:     1. Atypical chest pain -patient really describes noncardiac chest pain however with very strong family history of coronary artery disease and multiple risk factors would recommend screening.  We discussed the fact that she had a normal cath 7 years ago but that atherosclerosis can certainly worsen over time.  She is willing to have coronary calcium scoring to perform any further testing or medical interventions.  2. Palpitations -in the setting of atypical chest pain and strong family history we will plan for echocardiogram.  48-hour Holter monitor was worn and is being handed in today.  Will follow diagnostic testing further treatment recommendations.  3. Family history of premature coronary artery disease and sudden cardiac death  4. Hiatal hernia  5. NORTH on CPAP  6. Hypertension   7. dyslipidemia    Return to clinic 6 months             Discharge Medications          Accurate as of December 30, 2021  8:52 AM. If you have any questions, ask your nurse or doctor.            Continue These Medications      Instructions Start Date   aspirin 81 MG chewable tablet   81 mg, Oral, Daily      atorvastatin 20 MG tablet  Commonly known as: LIPITOR   20 mg, Oral, Daily      Euthyrox 50 MCG tablet  Generic drug: levothyroxine   50 mcg, Oral, Daily      fish oil 1000 MG capsule capsule   Oral      hydroCHLOROthiazide 12.5 MG capsule  Commonly known as: MICROZIDE   12.5 mg, Oral, Daily      lansoprazole 30 MG capsule  Commonly known as: PREVACID   30 mg, Oral, Daily      losartan 50 MG tablet  Commonly known as: COZAAR   50 mg, Oral, Daily      omeprazole 40 MG capsule  Commonly known as: priLOSEC   40 mg, Oral, Daily      sucralfate 1 g tablet  Commonly known as: CARAFATE   1 g, Oral, 4 Times Daily Before Meals & Nightly PRN      vitamin E 100 UNIT capsule   100 Units, Oral,  Daily             Thank you for allowing me to participate in the care of your patient,      Sincerely,   Rio Ring MD  [facility]  12/30/21  08:52 EST

## 2021-12-31 LAB
MAXIMAL PREDICTED HEART RATE: 166 BPM
STRESS TARGET HR: 141 BPM

## 2021-12-31 PROCEDURE — 93227 XTRNL ECG REC<48 HR R&I: CPT | Performed by: INTERNAL MEDICINE

## 2022-01-11 ENCOUNTER — OFFICE VISIT (OUTPATIENT)
Dept: SLEEP MEDICINE | Facility: HOSPITAL | Age: 57
End: 2022-01-11

## 2022-01-11 VITALS
SYSTOLIC BLOOD PRESSURE: 152 MMHG | OXYGEN SATURATION: 99 % | TEMPERATURE: 97.3 F | HEIGHT: 65 IN | BODY MASS INDEX: 35.32 KG/M2 | WEIGHT: 212 LBS | HEART RATE: 82 BPM | DIASTOLIC BLOOD PRESSURE: 84 MMHG

## 2022-01-11 DIAGNOSIS — G47.33 OSA (OBSTRUCTIVE SLEEP APNEA): Primary | ICD-10-CM

## 2022-01-11 PROCEDURE — G0463 HOSPITAL OUTPT CLINIC VISIT: HCPCS | Performed by: PSYCHIATRY & NEUROLOGY

## 2022-02-04 ENCOUNTER — APPOINTMENT (OUTPATIENT)
Dept: CT IMAGING | Facility: HOSPITAL | Age: 57
End: 2022-02-04

## 2022-02-04 ENCOUNTER — APPOINTMENT (OUTPATIENT)
Dept: CARDIOLOGY | Facility: HOSPITAL | Age: 57
End: 2022-02-04

## 2022-02-24 ENCOUNTER — HOSPITAL ENCOUNTER (OUTPATIENT)
Dept: CT IMAGING | Facility: HOSPITAL | Age: 57
Discharge: HOME | End: 2022-02-24
Admitting: INTERNAL MEDICINE

## 2022-02-24 DIAGNOSIS — R07.89 CHEST PAIN, ATYPICAL: ICD-10-CM

## 2022-02-24 PROCEDURE — 75571 CT HRT W/O DYE W/CA TEST: CPT

## 2022-03-02 ENCOUNTER — TELEPHONE (OUTPATIENT)
Dept: CARDIOLOGY | Facility: CLINIC | Age: 57
End: 2022-03-02

## 2022-03-02 NOTE — TELEPHONE ENCOUNTER
Left message to call regarding abnormal coronary calcium score.  Will plan to order stress study once discussed with patient.

## 2022-03-03 ENCOUNTER — HOSPITAL ENCOUNTER (OUTPATIENT)
Dept: CARDIOLOGY | Facility: HOSPITAL | Age: 57
Discharge: HOME | End: 2022-03-03
Admitting: INTERNAL MEDICINE

## 2022-03-03 VITALS
WEIGHT: 212 LBS | BODY MASS INDEX: 35.32 KG/M2 | HEIGHT: 65 IN | DIASTOLIC BLOOD PRESSURE: 100 MMHG | SYSTOLIC BLOOD PRESSURE: 189 MMHG

## 2022-03-03 DIAGNOSIS — R07.89 CHEST PAIN, ATYPICAL: ICD-10-CM

## 2022-03-03 LAB
AORTIC DIMENSIONLESS INDEX: 0.9 (DI)
BH CV ECHO MEAS - AO MAX PG: 10.7 MMHG
BH CV ECHO MEAS - AO MEAN PG: 5.4 MMHG
BH CV ECHO MEAS - AO ROOT DIAM: 3.4 CM
BH CV ECHO MEAS - AO V2 MAX: 163.9 CM/SEC
BH CV ECHO MEAS - AO V2 VTI: 33.6 CM
BH CV ECHO MEAS - AVA(I,D): 3.2 CM2
BH CV ECHO MEAS - EDV(CUBED): 105.1 ML
BH CV ECHO MEAS - EDV(MOD-SP2): 68 ML
BH CV ECHO MEAS - EDV(MOD-SP4): 71 ML
BH CV ECHO MEAS - EF(MOD-BP): 64 %
BH CV ECHO MEAS - EF(MOD-SP2): 63.2 %
BH CV ECHO MEAS - EF(MOD-SP4): 69 %
BH CV ECHO MEAS - ESV(CUBED): 25 ML
BH CV ECHO MEAS - ESV(MOD-SP2): 25 ML
BH CV ECHO MEAS - ESV(MOD-SP4): 22 ML
BH CV ECHO MEAS - FS: 38.1 %
BH CV ECHO MEAS - IVS/LVPW: 1.02 CM
BH CV ECHO MEAS - IVSD: 1.18 CM
BH CV ECHO MEAS - LAT PEAK E' VEL: 9.5 CM/SEC
BH CV ECHO MEAS - LV DIASTOLIC VOL/BSA (35-75): 35 CM2
BH CV ECHO MEAS - LV MASS(C)D: 205.1 GRAMS
BH CV ECHO MEAS - LV MAX PG: 9.7 MMHG
BH CV ECHO MEAS - LV MEAN PG: 4.5 MMHG
BH CV ECHO MEAS - LV SYSTOLIC VOL/BSA (12-30): 10.8 CM2
BH CV ECHO MEAS - LV V1 MAX: 155.6 CM/SEC
BH CV ECHO MEAS - LV V1 VTI: 30.5 CM
BH CV ECHO MEAS - LVIDD: 4.7 CM
BH CV ECHO MEAS - LVIDS: 2.9 CM
BH CV ECHO MEAS - LVOT AREA: 3.5 CM2
BH CV ECHO MEAS - LVOT DIAM: 2.12 CM
BH CV ECHO MEAS - LVPWD: 1.15 CM
BH CV ECHO MEAS - MED PEAK E' VEL: 10 CM/SEC
BH CV ECHO MEAS - MV A DUR: 0.08 SEC
BH CV ECHO MEAS - MV A MAX VEL: 79.8 CM/SEC
BH CV ECHO MEAS - MV DEC SLOPE: 770.2 CM/SEC2
BH CV ECHO MEAS - MV DEC TIME: 0.12 MSEC
BH CV ECHO MEAS - MV E MAX VEL: 92 CM/SEC
BH CV ECHO MEAS - MV E/A: 1.15
BH CV ECHO MEAS - MV MAX PG: 4.6 MMHG
BH CV ECHO MEAS - MV MEAN PG: 3.1 MMHG
BH CV ECHO MEAS - MV V2 VTI: 19.7 CM
BH CV ECHO MEAS - MVA(VTI): 5.4 CM2
BH CV ECHO MEAS - PA ACC TIME: 0.1 SEC
BH CV ECHO MEAS - PA PR(ACCEL): 36.2 MMHG
BH CV ECHO MEAS - PA V2 MAX: 152 CM/SEC
BH CV ECHO MEAS - RAP SYSTOLE: 8 MMHG
BH CV ECHO MEAS - RV MAX PG: 2.35 MMHG
BH CV ECHO MEAS - RV V1 MAX: 76.7 CM/SEC
BH CV ECHO MEAS - RV V1 VTI: 19.5 CM
BH CV ECHO MEAS - RVSP: 17 MMHG
BH CV ECHO MEAS - SI(MOD-SP2): 21.2 ML/M2
BH CV ECHO MEAS - SI(MOD-SP4): 24.2 ML/M2
BH CV ECHO MEAS - SV(LVOT): 107.2 ML
BH CV ECHO MEAS - SV(MOD-SP2): 43 ML
BH CV ECHO MEAS - SV(MOD-SP4): 49 ML
BH CV ECHO MEAS - TAPSE (>1.6): 2.2 CM
BH CV ECHO MEAS - TR MAX PG: 9.2 MMHG
BH CV ECHO MEAS - TR MAX VEL: 151.3 CM/SEC
BH CV ECHO MEASUREMENTS AVERAGE E/E' RATIO: 9.44
BH CV XLRA - RV BASE: 3.2 CM
BH CV XLRA - RV LENGTH: 8.2 CM
BH CV XLRA - RV MID: 2.37 CM
BH CV XLRA - TDI S': 16 CM/SEC
LEFT ATRIUM VOLUME INDEX: 24.1 ML/M2
MAXIMAL PREDICTED HEART RATE: 165 BPM
STRESS TARGET HR: 140 BPM

## 2022-03-03 PROCEDURE — 93306 TTE W/DOPPLER COMPLETE: CPT

## 2022-03-03 PROCEDURE — 93306 TTE W/DOPPLER COMPLETE: CPT | Performed by: INTERNAL MEDICINE

## 2022-03-03 NOTE — PROGRESS NOTES
Reviewed results with Marii Schuster and patient verbalized understanding.    Thank you,  Lashawn Joseph RN  Triage Nurse [facility]

## 2022-03-03 NOTE — TELEPHONE ENCOUNTER
Marii Schuster is returning your call regarding her calcium score.  She said she is available this afternoon at [phone].    Thank you,  Lashawn Joseph RN  Triage Nurse [facility]

## 2022-03-03 NOTE — PROGRESS NOTES
Please let patient know echocardiogram shows normal heart function and no significant valvular heart disease.

## 2022-03-04 ENCOUNTER — TELEPHONE (OUTPATIENT)
Dept: CARDIOLOGY | Facility: CLINIC | Age: 57
End: 2022-03-04

## 2022-03-07 ENCOUNTER — TELEPHONE (OUTPATIENT)
Dept: CARDIOLOGY | Facility: CLINIC | Age: 57
End: 2022-03-07

## 2022-03-07 DIAGNOSIS — R07.89 CHEST PAIN, ATYPICAL: Primary | ICD-10-CM

## 2022-03-07 NOTE — TELEPHONE ENCOUNTER
Spoke to patient about abnormal calcium score.  Given her on going episodes of atypical chest pain we will plan for stress study.  She cannot walk on the treadmill due to musculoskeletal issues.

## 2022-03-21 ENCOUNTER — TELEPHONE (OUTPATIENT)
Dept: CARDIOLOGY | Facility: CLINIC | Age: 57
End: 2022-03-21

## 2022-03-21 RX ORDER — ATORVASTATIN CALCIUM 40 MG/1
40 TABLET, FILM COATED ORAL NIGHTLY
Qty: 30 TABLET | Refills: 5 | Status: SHIPPED | OUTPATIENT
Start: 2022-03-21 | End: 2022-08-02 | Stop reason: SDUPTHER

## 2022-03-21 NOTE — TELEPHONE ENCOUNTER
Called and left VM. Will continue to try to reach patient.     Ashlie Mora RN  Triage [facility]

## 2022-03-21 NOTE — TELEPHONE ENCOUNTER
Reviewed results and recommendations with Marii Schuster.  Patient verbalized understanding.Patient is amenable to increasing her atorvastatin dosage to 40 mg.     Scheduling: please contact the patient to schedule her stress test.    Thank you,  Lashawn Joseph RN  Triage Nurse [place]

## 2022-03-21 NOTE — TELEPHONE ENCOUNTER
Please inform patient that her coronary calcium score was 135.  She needs to continue aspirin and atorvastatin.  However, I would like for her to increase her atorvastatin dose from 20 mg to 40 mg at that has not previously been tried with failure such as side effects.  Please see if she is willing to do this?

## 2022-03-30 ENCOUNTER — HOSPITAL ENCOUNTER (OUTPATIENT)
Dept: CARDIOLOGY | Facility: HOSPITAL | Age: 57
Discharge: HOME | End: 2022-03-30
Admitting: INTERNAL MEDICINE

## 2022-03-30 VITALS — HEIGHT: 65 IN | BODY MASS INDEX: 35.63 KG/M2 | WEIGHT: 213.85 LBS

## 2022-03-30 DIAGNOSIS — R07.89 CHEST PAIN, ATYPICAL: ICD-10-CM

## 2022-03-30 LAB
BH CV NUCLEAR PRIOR STUDY: 3
BH CV STRESS BP STAGE 1: NORMAL
BH CV STRESS COMMENTS STAGE 1: NORMAL
BH CV STRESS DOSE REGADENOSON STAGE 1: 0.4
BH CV STRESS DURATION MIN STAGE 1: 0
BH CV STRESS DURATION SEC STAGE 1: 10
BH CV STRESS HR STAGE 1: 156
BH CV STRESS NUCLEAR ISOTOPE DOSE: 36.3 MCI
BH CV STRESS PROTOCOL 1: NORMAL
BH CV STRESS RECOVERY BP: NORMAL MMHG
BH CV STRESS RECOVERY HR: 118 BPM
BH CV STRESS STAGE 1: 1
LV EF NUC BP: 78 %
MAXIMAL PREDICTED HEART RATE: 165 BPM
PERCENT MAX PREDICTED HR: 94.55 %
STRESS BASELINE BP: NORMAL MMHG
STRESS BASELINE HR: 117 BPM
STRESS PERCENT HR: 111 %
STRESS POST EXERCISE DUR SEC: 10 SEC
STRESS POST PEAK BP: NORMAL MMHG
STRESS POST PEAK HR: 156 BPM
STRESS TARGET HR: 140 BPM

## 2022-03-30 PROCEDURE — 93018 CV STRESS TEST I&R ONLY: CPT | Performed by: INTERNAL MEDICINE

## 2022-03-30 PROCEDURE — 78451 HT MUSCLE IMAGE SPECT SING: CPT

## 2022-03-30 PROCEDURE — 78451 HT MUSCLE IMAGE SPECT SING: CPT | Performed by: INTERNAL MEDICINE

## 2022-03-30 PROCEDURE — 25010000002 REGADENOSON 0.4 MG/5ML SOLUTION: Performed by: INTERNAL MEDICINE

## 2022-03-30 PROCEDURE — 0 TECHNETIUM TETROFOSMIN KIT: Performed by: INTERNAL MEDICINE

## 2022-03-30 PROCEDURE — 93016 CV STRESS TEST SUPVJ ONLY: CPT | Performed by: INTERNAL MEDICINE

## 2022-03-30 PROCEDURE — A9502 TC99M TETROFOSMIN: HCPCS | Performed by: INTERNAL MEDICINE

## 2022-03-30 PROCEDURE — 93017 CV STRESS TEST TRACING ONLY: CPT

## 2022-03-30 RX ADMIN — REGADENOSON 0.4 MG: 0.08 INJECTION, SOLUTION INTRAVENOUS at 07:35

## 2022-03-30 RX ADMIN — TETROFOSMIN 1 DOSE: 1.38 INJECTION, POWDER, LYOPHILIZED, FOR SOLUTION INTRAVENOUS at 07:35

## 2022-06-23 ENCOUNTER — TRANSCRIBE ORDERS (OUTPATIENT)
Dept: ADMINISTRATIVE | Facility: HOSPITAL | Age: 57
End: 2022-06-23

## 2022-06-23 DIAGNOSIS — R91.1 PULMONARY NODULE: Primary | ICD-10-CM

## 2022-06-24 ENCOUNTER — HOSPITAL ENCOUNTER (OUTPATIENT)
Dept: CT IMAGING | Facility: HOSPITAL | Age: 57
Discharge: HOME | End: 2022-06-24
Admitting: NURSE PRACTITIONER

## 2022-06-24 DIAGNOSIS — R91.1 PULMONARY NODULE: ICD-10-CM

## 2022-06-24 PROCEDURE — 71250 CT THORAX DX C-: CPT

## 2022-07-08 ENCOUNTER — OFFICE VISIT (OUTPATIENT)
Dept: CARDIOLOGY | Facility: CLINIC | Age: 57
End: 2022-07-08

## 2022-07-08 VITALS
WEIGHT: 223.8 LBS | HEART RATE: 85 BPM | BODY MASS INDEX: 38.21 KG/M2 | HEIGHT: 64 IN | DIASTOLIC BLOOD PRESSURE: 80 MMHG | SYSTOLIC BLOOD PRESSURE: 138 MMHG

## 2022-07-08 DIAGNOSIS — R07.89 CHEST PAIN, ATYPICAL: Primary | ICD-10-CM

## 2022-07-08 DIAGNOSIS — Z82.41 FAMILY HISTORY OF SUDDEN CARDIAC DEATH: ICD-10-CM

## 2022-07-08 PROCEDURE — 99214 OFFICE O/P EST MOD 30 MIN: CPT | Performed by: INTERNAL MEDICINE

## 2022-07-08 RX ORDER — METOPROLOL SUCCINATE 25 MG/1
25 TABLET, EXTENDED RELEASE ORAL DAILY
Qty: 30 TABLET | Refills: 11 | Status: SHIPPED | OUTPATIENT
Start: 2022-07-08

## 2022-07-08 RX ORDER — FERROUS SULFATE 325(65) MG
325 TABLET ORAL
COMMUNITY

## 2022-07-08 NOTE — PROGRESS NOTES
[facility]      Patient Name: Marii Schuster  :1965  Age: 57 y.o.  Encounter Provider:  Rio Ring Jr, MD      Chief Complaint: Follow-up chest pain        HPI  Marii Schuster is a 57 y.o. female past medical history of hypertension, dyslipidemia, hiatal hernia, NORTH on CPAP, obesity who presents for follow-up evaluation of chest discomfort.      Last clinic visit note: Patient is struggled on and off with chest pain for over a decade.  In  she had cardiac catheterization showing normal coronary arteries per patient report.  She had an echocardiogram at the same time which showed normal EF.  She has daily sharp episodes of chest pain.  No relationship to physical activity.  Fleeting episodes with no associated nausea, diaphoresis or shortness of air.  She wakes up often with palpitations which last anywhere from seconds to minutes.  She has chronic lower extremity edema and has been diagnosed with venous insufficiency approximately 18 to 24 months ago.  She has a very strong family history of coronary artery disease.  Father diagnosed in his 40s with coronary artery disease and status post CABG.  Fatal MI at age 62.  Brother with heart attack at age 40.  2 other brothers with coronary revascularization.  Sister with nonfatal myocardial infarction at age 60.  She is a former smoker quit in , denies alcohol or illicit drug use.    Coronary calcium score was mildly elevated at 163.  Stress study was performed showing no evidence of myocardial ischemia.  Unfortunately she continues to have atypical chest pain with no clear relationship to physical activity.  She did have a scope done 6 months ago which showed mild gastritis as well as small hiatal hernia.  Her gastroenterologist optimized her acid reduction protocol but she has no change in symptoms.  She has fair functional capacity but does not do a whole lot during her normal week.  She denies orthopnea, PND.  She has  "chronic stable lower extremity edema which she feels is related to venous insufficiency.  Blood pressure is upper limits of normal today in clinic.  Resting heart rate remains elevated.  Social family history was reviewed and is not pertinent to this clinic visit.  She is tolerating statin well but stopped taking aspirin.  She is not sure why the aspirin was stopped but she has been started on iron sulfate since last visit.    The following portions of the patient's history were reviewed and updated as appropriate: allergies, current medications, past family history, past medical history, past social history, past surgical history and problem list.      Review of Systems   Constitutional: Negative for chills and fever.   HENT: Negative for hoarse voice and sore throat.    Eyes: Negative for double vision and photophobia.   Cardiovascular: Positive for chest pain, leg swelling and palpitations. Negative for near-syncope, orthopnea, paroxysmal nocturnal dyspnea and syncope.   Respiratory: Negative for cough and wheezing.    Skin: Negative for poor wound healing and rash.   Musculoskeletal: Negative for arthritis and joint swelling.   Gastrointestinal: Negative for bloating, abdominal pain, hematemesis and hematochezia.   Neurological: Negative for dizziness and focal weakness.   Psychiatric/Behavioral: Negative for depression and suicidal ideas.       OBJECTIVE:   Vital Signs  Vitals:    07/08/22 0905   BP: 138/80   Pulse: 85     Estimated body mass index is 38.42 kg/m² as calculated from the following:    Height as of this encounter: 162.6 cm (64\").    Weight as of this encounter: 102 kg (223 lb 12.8 oz).    Vitals reviewed.   Constitutional:       Appearance: Healthy appearance. Not in distress.   Neck:      Vascular: No JVR. JVD normal.   Pulmonary:      Effort: Pulmonary effort is normal.      Breath sounds: Normal breath sounds. No wheezing. No rhonchi. No rales.   Chest:      Chest wall: Not tender to " palpatation.   Cardiovascular:      PMI at left midclavicular line. Normal rate. Regular rhythm. Normal S1. Normal S2.      Murmurs: There is no murmur.      No gallop. No click. No rub.   Pulses:     Intact distal pulses.   Edema:     Peripheral edema absent.   Abdominal:      General: Bowel sounds are normal.      Palpations: Abdomen is soft.      Tenderness: There is no abdominal tenderness.   Musculoskeletal: Normal range of motion.         General: No tenderness. Skin:     General: Skin is warm and dry.   Neurological:      General: No focal deficit present.      Mental Status: Alert and oriented to person, place and time.         Procedures          ASSESSMENT:     Atypical chest pain  Palpitations  Family history of premature coronary artery disease  Hiatal hernia  NORTH on CPAP  Hypertension  Dyslipidemia    PLAN OF CARE:     1. Atypical chest pain -atypical pain characteristics with no clear relationship to physical activity.  I am a little worried about her family history but am not convinced clinical story currently.  I will investigate PCP notes to see if she had anemia that was attributed to the aspirin however recent scope showed no evidence of bleeding and the patient has no current signs of GI bleed.  In all likelihood we will restart aspirin.  I will add beta-blocker.  She will send a twice daily blood pressure log after 1 week.  I will have her back in clinic in 3 months for short-term evaluation of symptom progression.  If symptoms persist or worsen we will plan for cardiac catheterization.  2. Palpitations - 48-hour Holter monitor showed no evidence of sustained arrhythmia.  Symptoms seem to have resolved.  3. Family history of premature coronary artery disease and sudden cardiac death  4. Hiatal hernia  5. NORTH on CPAP  6. Hypertension   7. dyslipidemia    Return to clinic 3 months             Discharge Medications          Accurate as of July 8, 2022  9:05 AM. If you have any questions, ask your  nurse or doctor.            Continue These Medications      Instructions Start Date   aspirin 81 MG chewable tablet   81 mg, Oral, Daily      atorvastatin 40 MG tablet  Commonly known as: LIPITOR   40 mg, Oral, Nightly      Euthyrox 50 MCG tablet  Generic drug: levothyroxine   50 mcg, Oral, Daily      ferrous sulfate 325 (65 FE) MG tablet   325 mg, Oral, Daily With Breakfast      fish oil 1000 MG capsule capsule   Oral      hydroCHLOROthiazide 12.5 MG capsule  Commonly known as: MICROZIDE   12.5 mg, Oral, Daily      lansoprazole 30 MG capsule  Commonly known as: PREVACID   30 mg, Oral, Daily      losartan 50 MG tablet  Commonly known as: COZAAR   50 mg, Oral, Daily      omeprazole 40 MG capsule  Commonly known as: priLOSEC   40 mg, Oral, Daily      sucralfate 1 g tablet  Commonly known as: CARAFATE   1 g, Oral, 4 Times Daily Before Meals & Nightly PRN      vitamin E 100 UNIT capsule   100 Units, Oral, Daily             Thank you for allowing me to participate in the care of your patient,      Sincerely,   Rio Ring MD  [facility]  07/08/22  09:05 EDT

## 2022-07-26 NOTE — PROGRESS NOTES
[facility]    SLEEP CLINIC FOLLOW UP PROGRESS NOTE.    Marii Schuster  1965  57 y.o.  female      PCP: Christel Jonas APRN      Date of visit: 1/11/2022  The patient is returning for follow-up visit.  Reason for follow-up visit: Obstructive sleep apnea      HPI:  This is a 57 y.o. years old patient who has a history of obstructive sleep apnea.  She is here for  follow-up of her sleep apnea.  She was diagnosed with severe sleep apnea on July 11, 2010 after she had a split-night study.  SHe had severe NORTH with an AHI of 119.6 events per hour.She was treated with CPAP with an improvement in her complaints of insomnia, unrestful sleep, frequent nighttime awakenings and snoring.  She was last seen for a compliance follow-up on 10/30/2019 showing 83% days with device usage.  She reports that she is doing well.  She is using her CPAP machine regularly.  There are nights that she wakes up with some palpitations.  Normally goes to bed at around 10 PM and wakes up at around 6 AM.  She stays mostly asleep during the night.   Feels refreshed after waking up.  Patient also denies headaches and nasal congestion.   Patient reports however that she probably needs a new CPAP machine.  The machine is getting noisy.  Since her last follow-up visit, she has lost approximately 7 pounds.    Mediactions and allergies are reviewed by me and documented in the encounter.     Interval past medical/surgical history: COVID infection 11/26/2019  Chest pains, lung nodules.    SOCIAL ( habits pertaining to sleep medicine)  History of smoking:Not currently.  History of alcohol use: 0 per week  Caffeine use: 1 cup of coffee    REVIEW OF SYSTEMS:   Sassafras Sleepiness Scale :Total score: 6   Nsaal congestion: No  Dry mouth/nose: No  Post nasal drip; no  Acid reflux/Heartburn: No  Abd bloating: No  Morining headache: No  Anxiety: No  Depression: No    Physical Exam :  CONSTITUTIONAL:  Vitals:    01/11/22 0900   BP: 152/84  "  Pulse: 82   Temp: 97.3 °F (36.3 °C)   SpO2: 99%   Weight: 96.2 kg (212 lb)   Height: 165.1 cm (65\")    Body mass index is 35.28 kg/m².   Neck: Short neck with increased amount of adipose tissue under the chin and around the neck area.  No carotid bruits.  ENT: Mallampati class IV.  Deep narrow airway.  RESP SYSTEM: Breath sounds are normal, no wheezes or crackles  CARDIOVASULAR: Heart rate is regular without murmur.   Neuropsych: She is awake alert and oriented.  Responses are coherent and relevant.  Mood and affect appeared appropriate.    Data reviewed:  The Smart card downloaded on 1/12/2022 has been reviewed independently by me for compliance and discussed the data with the patient.   Compliance; 100%  More than 4 hr use, 100%  Average use of the device 8 hours 51 minutes per night  Residual AHI: 0.9 /hr (goal < 5.0 /hr).  Her auto CPAP is set between 12 to 20 cm water pressure.  Mask type: Fullface [employer] [place]  DME: [employer]     ASSESSMENT AND PLAN:  · Obstructive sleep apnea ( G 47.33).  The symptoms of sleep apnea have improved with the device and the treatment.  Patient's compliance with the device is excellent for treatment of sleep apnea.  I have independently reviewed the smart card down load and discussed with the patient the download data and encouraged  the patient to continue to use the device.The residual AHI is acceptable. The patient is also instructed to get the supplies from the [employer] and and change them on a regular basis.  A prescription for supplies has been sent to the [employer] [facility].  I have also discussed the good sleep hygiene habits and adequate amount of sleep needed for good health.    · I will order a new CPAP machine for her at 12 to 20 cm water pressure.  · Return for 31 to 90 days after PAP setup. . Patient's questions were answered.      Glory Root MD  7/26/2022               "

## 2022-08-02 RX ORDER — ATORVASTATIN CALCIUM 40 MG/1
40 TABLET, FILM COATED ORAL NIGHTLY
Qty: 30 TABLET | Refills: 5 | Status: SHIPPED | OUTPATIENT
Start: 2022-08-02 | End: 2023-03-13

## 2022-09-26 ENCOUNTER — TRANSCRIBE ORDERS (OUTPATIENT)
Dept: ADMINISTRATIVE | Facility: HOSPITAL | Age: 57
End: 2022-09-26

## 2022-09-26 DIAGNOSIS — E27.9: Primary | ICD-10-CM

## 2022-10-22 ENCOUNTER — HOSPITAL ENCOUNTER (OUTPATIENT)
Dept: CT IMAGING | Facility: HOSPITAL | Age: 57
Discharge: HOME | End: 2022-10-22
Admitting: NURSE PRACTITIONER

## 2022-10-22 DIAGNOSIS — E27.9: ICD-10-CM

## 2022-10-22 LAB — CREAT BLDA-MCNC: 0.5 MG/DL (ref 0.6–1.3)

## 2022-10-22 PROCEDURE — 82565 ASSAY OF CREATININE: CPT

## 2022-10-22 PROCEDURE — 74178 CT ABD&PLV WO CNTR FLWD CNTR: CPT

## 2022-10-22 PROCEDURE — 25010000002 IOPAMIDOL 61 % SOLUTION: Performed by: NURSE PRACTITIONER

## 2022-10-22 RX ADMIN — IOPAMIDOL 85 ML: 612 INJECTION, SOLUTION INTRAVENOUS at 12:01

## 2022-11-02 ENCOUNTER — TELEPHONE (OUTPATIENT)
Dept: SLEEP MEDICINE | Facility: HOSPITAL | Age: 57
End: 2022-11-02

## 2023-03-13 RX ORDER — ATORVASTATIN CALCIUM 40 MG/1
TABLET, FILM COATED ORAL
Qty: 90 TABLET | Refills: 0 | Status: SHIPPED | OUTPATIENT
Start: 2023-03-13

## 2023-08-01 ENCOUNTER — HOSPITAL ENCOUNTER (OUTPATIENT)
Dept: MAMMOGRAPHY | Facility: HOSPITAL | Age: 58
Discharge: HOME | End: 2023-08-01
Admitting: SPECIALIST
Payer: COMMERCIAL

## 2023-08-01 DIAGNOSIS — Z12.31 VISIT FOR SCREENING MAMMOGRAM: ICD-10-CM

## 2023-08-01 PROCEDURE — 77067 SCR MAMMO BI INCL CAD: CPT

## 2023-08-01 PROCEDURE — 77063 BREAST TOMOSYNTHESIS BI: CPT

## 2023-08-22 ENCOUNTER — TRANSCRIBE ORDERS (OUTPATIENT)
Dept: ADMINISTRATIVE | Facility: HOSPITAL | Age: 58
End: 2023-08-22
Payer: COMMERCIAL

## 2023-08-22 DIAGNOSIS — E27.8 ABNORMALITY OF CORTISOL-BINDING GLOBULIN: Primary | ICD-10-CM

## 2023-09-08 ENCOUNTER — HOSPITAL ENCOUNTER (OUTPATIENT)
Facility: HOSPITAL | Age: 58
Discharge: HOME | End: 2023-09-08
Admitting: NURSE PRACTITIONER
Payer: COMMERCIAL

## 2023-09-08 DIAGNOSIS — E27.8 ABNORMALITY OF CORTISOL-BINDING GLOBULIN: ICD-10-CM

## 2023-09-08 PROCEDURE — 74170 CT ABD WO CNTRST FLWD CNTRST: CPT

## 2023-09-08 PROCEDURE — 25510000001 IOPAMIDOL 61 % SOLUTION: Performed by: NURSE PRACTITIONER

## 2023-09-08 RX ADMIN — IOPAMIDOL 100 ML: 612 INJECTION, SOLUTION INTRAVENOUS at 12:30

## 2023-09-15 ENCOUNTER — TRANSCRIBE ORDERS (OUTPATIENT)
Dept: ADMINISTRATIVE | Facility: HOSPITAL | Age: 58
End: 2023-09-15
Payer: COMMERCIAL

## 2023-09-15 DIAGNOSIS — R74.8 ABNORMAL LEVELS OF OTHER SERUM ENZYMES: Primary | ICD-10-CM

## 2023-09-19 ENCOUNTER — TELEPHONE (OUTPATIENT)
Dept: CARDIOLOGY | Facility: CLINIC | Age: 58
End: 2023-09-19

## 2023-09-19 NOTE — TELEPHONE ENCOUNTER
"Called Marii Schuster for additional information.    Patient reports intermittent chest pain that occurs at random and is located in the center of her chest.  Patient described the pain as \"needle pricks\" or occasionally \"sharp pains\" which occur briefly and resolve.  Sometimes after episodes of chest pain resolve, patient reports experiencing pain across her back.  Patient also has occasional discomfort in her jaw that is described as a heaviness that lasts a couple of seconds then resolves.  Patient stated that the jaw pain does not usually occur with the chest or back pain.  Pain has been occurring over the last week and patient reports pain is similar to what she experienced in July 2022.    Patient also reports frequent belching even though she is not eating much.  Patient stated she just feels \"yucky\".  Patient also has a productive cough for which she saw her PCP.  PCP prescribed Amoxicillin/clavulanate 125 mg tablets, BID for ten days.    Patient \"tested\" out her chest pain over the weekend and went for a short walk (less than 0.5 miles).  Patient reports no issues during walk, however, when she sat down to rest after the walk, patient reports feeling a sharp pain in the center of her chest, which was followed by a belch.  After belching, patient reports symptoms resolved.    Date BP Notes   18-Sep 145/94 patient reports headache, pain in back of her neck   19-Sep 143/84      Per Dr. Ring's last office note (7/8/2022), patient was due to f/u in 3 months but has not been since in office since.  Patient agreeable to see Dr. Ring on 9/20 at 11:30 am.    Please let me know if there is anything else you would like me to do for this patient.    Thank you,  Lashawn WHITEHEAD RN  Triage Nurse [facility]   15:16 EDT           "

## 2023-09-19 NOTE — TELEPHONE ENCOUNTER
"  Caller: CONCHA    Relationship: SELF    Best call back number: [phone]        What was the call regarding:    SHE STATES THAT SHE HAS BEEN STILL HAVING THE SAME SYMPTOMS LAST VISIT (7/8/2022)- RECENTLY, OVER THE LAST WEEK, IT SEEMS LIKE THEY ARE GETTING WORSE (SHARP PAINS LIKE A NEEDLE PRICK IN THE SHOULDER, CHEST, NECK) , SHE GETS A SPLIT SECOND \"HEAVINESS\" IN HER JAW, ANS SHE ALSO IS CONSTANTLY BELCHING.    NEXT OV 10/23/2023  "

## 2023-09-20 ENCOUNTER — OFFICE VISIT (OUTPATIENT)
Dept: CARDIOLOGY | Facility: CLINIC | Age: 58
End: 2023-09-20
Payer: COMMERCIAL

## 2023-09-20 VITALS
HEIGHT: 64 IN | HEART RATE: 90 BPM | BODY MASS INDEX: 38.55 KG/M2 | OXYGEN SATURATION: 97 % | DIASTOLIC BLOOD PRESSURE: 90 MMHG | SYSTOLIC BLOOD PRESSURE: 140 MMHG | WEIGHT: 225.8 LBS

## 2023-09-20 DIAGNOSIS — R07.2 PRECORDIAL PAIN: Primary | ICD-10-CM

## 2023-09-20 PROCEDURE — 99214 OFFICE O/P EST MOD 30 MIN: CPT | Performed by: INTERNAL MEDICINE

## 2023-09-20 RX ORDER — AMOXICILLIN AND CLAVULANATE POTASSIUM 875; 125 MG/1; MG/1
TABLET, FILM COATED ORAL
COMMUNITY
Start: 2023-09-19

## 2023-09-21 RX ORDER — METOPROLOL SUCCINATE 50 MG/1
50 TABLET, EXTENDED RELEASE ORAL DAILY
Qty: 90 TABLET | Refills: 3 | Status: SHIPPED | OUTPATIENT
Start: 2023-09-21

## 2023-09-21 NOTE — PROGRESS NOTES
[facility]      Patient Name: Marii Schuster  :1965  Age: 58 y.o.  Encounter Provider:  Rio Ring Jr, MD      Chief Complaint: Follow-up chest pain        HPI  Marii Schuster is a 58 y.o. female past medical history of hypertension, dyslipidemia, hiatal hernia, NORTH on CPAP, obesity who presents for follow-up evaluation of chest discomfort.      Last clinic visit note: Patient is struggled on and off with chest pain for over a decade.  In  she had cardiac catheterization showing normal coronary arteries per patient report.  She had an echocardiogram at the same time which showed normal EF.  She has daily sharp episodes of chest pain.  No relationship to physical activity.  Fleeting episodes with no associated nausea, diaphoresis or shortness of air.  She wakes up often with palpitations which last anywhere from seconds to minutes.  She has chronic lower extremity edema and has been diagnosed with venous insufficiency approximately 18 to 24 months ago.  She has a very strong family history of coronary artery disease.  Father diagnosed in his 40s with coronary artery disease and status post CABG.  Fatal MI at age 62.  Brother with heart attack at age 40.  2 other brothers with coronary revascularization.  Sister with nonfatal myocardial infarction at age 60.  She is a former smoker quit in , denies alcohol or illicit drug use.    Coronary calcium score was mildly elevated at 163.  Stress study was performed showing no evidence of myocardial ischemia.  Unfortunately she continues to have atypical chest pain with no clear relationship to physical activity.  She did have a scope done 6 months ago which showed mild gastritis as well as small hiatal hernia.  Her gastroenterologist optimized her acid reduction protocol but she has no change in symptoms.  She has fair functional capacity but does not do a whole lot during her normal week.  She denies orthopnea, PND.  She has  chronic stable lower extremity edema which she feels is related to venous insufficiency.  Blood pressure is upper limits of normal today in clinic.  Resting heart rate remains elevated.  Social family history was reviewed and is not pertinent to this clinic visit.  She is tolerating statin well but stopped taking aspirin.  She is not sure why the aspirin was stopped but she has been started on iron sulfate since last visit.    She continues to have atypical chest pain which she describes as a sharp central chest pain with radiation towards the neck.  No clear relationship to physical activity.  No orthopnea, PND or edema.  No palpitations, dizziness or syncope.  Blood pressure is borderline elevated today 140/90 mmHg.  Elevated resting heart rate of 103 bpm.  No cardiac complaints at time of interview.    The following portions of the patient's history were reviewed and updated as appropriate: allergies, current medications, past family history, past medical history, past social history, past surgical history and problem list.      Review of Systems   Constitutional: Negative for chills and fever.   HENT:  Negative for hoarse voice and sore throat.    Eyes:  Negative for double vision and photophobia.   Cardiovascular:  Positive for chest pain, leg swelling and palpitations. Negative for near-syncope, orthopnea, paroxysmal nocturnal dyspnea and syncope.   Respiratory:  Negative for cough and wheezing.    Skin:  Negative for poor wound healing and rash.   Musculoskeletal:  Negative for arthritis and joint swelling.   Gastrointestinal:  Negative for bloating, abdominal pain, hematemesis and hematochezia.   Neurological:  Negative for dizziness and focal weakness.   Psychiatric/Behavioral:  Negative for depression and suicidal ideas.      OBJECTIVE:   Vital Signs  Vitals:    09/20/23 1133   BP: 140/90   Pulse: 90   SpO2: 97%     Estimated body mass index is 38.76 kg/m² as calculated from the following:    Height as of  "this encounter: 162.6 cm (64\").    Weight as of this encounter: 102 kg (225 lb 12.8 oz).    Vitals reviewed.   Constitutional:       Appearance: Healthy appearance. Not in distress.   Neck:      Vascular: No JVR. JVD normal.   Pulmonary:      Effort: Pulmonary effort is normal.      Breath sounds: Normal breath sounds. No wheezing. No rhonchi. No rales.   Chest:      Chest wall: Not tender to palpatation.   Cardiovascular:      PMI at left midclavicular line. Normal rate. Regular rhythm. Normal S1. Normal S2.       Murmurs: There is no murmur.      No gallop.  No click. No rub.   Pulses:     Intact distal pulses.   Edema:     Peripheral edema absent.   Abdominal:      General: Bowel sounds are normal.      Palpations: Abdomen is soft.      Tenderness: There is no abdominal tenderness.   Musculoskeletal: Normal range of motion.         General: No tenderness. Skin:     General: Skin is warm and dry.   Neurological:      General: No focal deficit present.      Mental Status: Alert and oriented to person, place and time.       Procedures          ASSESSMENT:     Atypical chest pain  Palpitations  Family history of premature coronary artery disease  Hiatal hernia  NORTH on CPAP  Hypertension  Dyslipidemia    PLAN OF CARE:     Atypical chest pain -she continues to have recurrent atypical chest pain that is worrying her.  No evidence of myocardial ischemia on stress study.  Given the mildly elevated calcium score a CT coronary angiogram should be diagnostic if we can get her heart rate better controlled.  I will double her metoprolol dose..  Follow diagnostic testing results for further treatment recommendations.  Palpitations - 48-hour Holter monitor showed no evidence of sustained arrhythmia.  Symptoms seem to have resolved.  Family history of premature coronary artery disease and sudden cardiac death  Hiatal hernia  NORTH on CPAP  Hypertension   dyslipidemia    Return to clinic 6 months             Discharge Medications    "         Accurate as of September 20, 2023 11:59 PM. If you have any questions, ask your nurse or doctor.                Continue These Medications        Instructions Start Date   amoxicillin-clavulanate 875-125 MG per tablet  Commonly known as: AUGMENTIN       aspirin 81 MG chewable tablet   81 mg, Oral, Daily      atorvastatin 40 MG tablet  Commonly known as: LIPITOR   TAKE 1 TABLET EVERY NIGHT      Euthyrox 50 MCG tablet  Generic drug: levothyroxine   50 mcg, Oral, Daily      ferrous sulfate 325 (65 FE) MG tablet   325 mg, Oral, Daily With Breakfast      fish oil 1000 MG capsule capsule   Oral      hydroCHLOROthiazide 12.5 MG capsule  Commonly known as: MICROZIDE   12.5 mg, Oral, Daily      lansoprazole 30 MG capsule  Commonly known as: PREVACID   30 mg, Oral, Daily      losartan 50 MG tablet  Commonly known as: COZAAR   50 mg, Oral, Daily      metoprolol succinate XL 25 MG 24 hr tablet  Commonly known as: TOPROL-XL   25 mg, Oral, Daily      omeprazole 40 MG capsule  Commonly known as: priLOSEC   40 mg, Oral, Daily      sucralfate 1 g tablet  Commonly known as: CARAFATE   1 g, Oral, 4 Times Daily Before Meals & Nightly PRN      vitamin E 100 UNIT capsule   100 Units, Oral, Daily               Thank you for allowing me to participate in the care of your patient,      Sincerely,   Rio Ring MD  [facility]  09/21/23  13:00 EDT

## 2023-09-21 NOTE — H&P (VIEW-ONLY)
[facility]      Patient Name: Marii Schuster  :1965  Age: 58 y.o.  Encounter Provider:  Rio Ring Jr, MD      Chief Complaint: Follow-up chest pain        HPI  Marii Schuster is a 58 y.o. female past medical history of hypertension, dyslipidemia, hiatal hernia, NORTH on CPAP, obesity who presents for follow-up evaluation of chest discomfort.      Last clinic visit note: Patient is struggled on and off with chest pain for over a decade.  In  she had cardiac catheterization showing normal coronary arteries per patient report.  She had an echocardiogram at the same time which showed normal EF.  She has daily sharp episodes of chest pain.  No relationship to physical activity.  Fleeting episodes with no associated nausea, diaphoresis or shortness of air.  She wakes up often with palpitations which last anywhere from seconds to minutes.  She has chronic lower extremity edema and has been diagnosed with venous insufficiency approximately 18 to 24 months ago.  She has a very strong family history of coronary artery disease.  Father diagnosed in his 40s with coronary artery disease and status post CABG.  Fatal MI at age 62.  Brother with heart attack at age 40.  2 other brothers with coronary revascularization.  Sister with nonfatal myocardial infarction at age 60.  She is a former smoker quit in , denies alcohol or illicit drug use.    Coronary calcium score was mildly elevated at 163.  Stress study was performed showing no evidence of myocardial ischemia.  Unfortunately she continues to have atypical chest pain with no clear relationship to physical activity.  She did have a scope done 6 months ago which showed mild gastritis as well as small hiatal hernia.  Her gastroenterologist optimized her acid reduction protocol but she has no change in symptoms.  She has fair functional capacity but does not do a whole lot during her normal week.  She denies orthopnea, PND.  She has  chronic stable lower extremity edema which she feels is related to venous insufficiency.  Blood pressure is upper limits of normal today in clinic.  Resting heart rate remains elevated.  Social family history was reviewed and is not pertinent to this clinic visit.  She is tolerating statin well but stopped taking aspirin.  She is not sure why the aspirin was stopped but she has been started on iron sulfate since last visit.    She continues to have atypical chest pain which she describes as a sharp central chest pain with radiation towards the neck.  No clear relationship to physical activity.  No orthopnea, PND or edema.  No palpitations, dizziness or syncope.  Blood pressure is borderline elevated today 140/90 mmHg.  Elevated resting heart rate of 103 bpm.  No cardiac complaints at time of interview.    The following portions of the patient's history were reviewed and updated as appropriate: allergies, current medications, past family history, past medical history, past social history, past surgical history and problem list.      Review of Systems   Constitutional: Negative for chills and fever.   HENT:  Negative for hoarse voice and sore throat.    Eyes:  Negative for double vision and photophobia.   Cardiovascular:  Positive for chest pain, leg swelling and palpitations. Negative for near-syncope, orthopnea, paroxysmal nocturnal dyspnea and syncope.   Respiratory:  Negative for cough and wheezing.    Skin:  Negative for poor wound healing and rash.   Musculoskeletal:  Negative for arthritis and joint swelling.   Gastrointestinal:  Negative for bloating, abdominal pain, hematemesis and hematochezia.   Neurological:  Negative for dizziness and focal weakness.   Psychiatric/Behavioral:  Negative for depression and suicidal ideas.      OBJECTIVE:   Vital Signs  Vitals:    09/20/23 1133   BP: 140/90   Pulse: 90   SpO2: 97%     Estimated body mass index is 38.76 kg/m² as calculated from the following:    Height as of  "this encounter: 162.6 cm (64\").    Weight as of this encounter: 102 kg (225 lb 12.8 oz).    Vitals reviewed.   Constitutional:       Appearance: Healthy appearance. Not in distress.   Neck:      Vascular: No JVR. JVD normal.   Pulmonary:      Effort: Pulmonary effort is normal.      Breath sounds: Normal breath sounds. No wheezing. No rhonchi. No rales.   Chest:      Chest wall: Not tender to palpatation.   Cardiovascular:      PMI at left midclavicular line. Normal rate. Regular rhythm. Normal S1. Normal S2.       Murmurs: There is no murmur.      No gallop.  No click. No rub.   Pulses:     Intact distal pulses.   Edema:     Peripheral edema absent.   Abdominal:      General: Bowel sounds are normal.      Palpations: Abdomen is soft.      Tenderness: There is no abdominal tenderness.   Musculoskeletal: Normal range of motion.         General: No tenderness. Skin:     General: Skin is warm and dry.   Neurological:      General: No focal deficit present.      Mental Status: Alert and oriented to person, place and time.       Procedures          ASSESSMENT:     Atypical chest pain  Palpitations  Family history of premature coronary artery disease  Hiatal hernia  NORTH on CPAP  Hypertension  Dyslipidemia    PLAN OF CARE:     Atypical chest pain -she continues to have recurrent atypical chest pain that is worrying her.  No evidence of myocardial ischemia on stress study.  Given the mildly elevated calcium score a CT coronary angiogram should be diagnostic if we can get her heart rate better controlled.  I will double her metoprolol dose..  Follow diagnostic testing results for further treatment recommendations.  Palpitations - 48-hour Holter monitor showed no evidence of sustained arrhythmia.  Symptoms seem to have resolved.  Family history of premature coronary artery disease and sudden cardiac death  Hiatal hernia  NORTH on CPAP  Hypertension   dyslipidemia    Return to clinic 6 months             Discharge Medications    "         Accurate as of September 20, 2023 11:59 PM. If you have any questions, ask your nurse or doctor.                Continue These Medications        Instructions Start Date   amoxicillin-clavulanate 875-125 MG per tablet  Commonly known as: AUGMENTIN       aspirin 81 MG chewable tablet   81 mg, Oral, Daily      atorvastatin 40 MG tablet  Commonly known as: LIPITOR   TAKE 1 TABLET EVERY NIGHT      Euthyrox 50 MCG tablet  Generic drug: levothyroxine   50 mcg, Oral, Daily      ferrous sulfate 325 (65 FE) MG tablet   325 mg, Oral, Daily With Breakfast      fish oil 1000 MG capsule capsule   Oral      hydroCHLOROthiazide 12.5 MG capsule  Commonly known as: MICROZIDE   12.5 mg, Oral, Daily      lansoprazole 30 MG capsule  Commonly known as: PREVACID   30 mg, Oral, Daily      losartan 50 MG tablet  Commonly known as: COZAAR   50 mg, Oral, Daily      metoprolol succinate XL 25 MG 24 hr tablet  Commonly known as: TOPROL-XL   25 mg, Oral, Daily      omeprazole 40 MG capsule  Commonly known as: priLOSEC   40 mg, Oral, Daily      sucralfate 1 g tablet  Commonly known as: CARAFATE   1 g, Oral, 4 Times Daily Before Meals & Nightly PRN      vitamin E 100 UNIT capsule   100 Units, Oral, Daily               Thank you for allowing me to participate in the care of your patient,      Sincerely,   Rio Ring MD  [facility]  09/21/23  13:00 EDT

## 2023-09-22 LAB — CREAT BLDA-MCNC: 0.5 MG/DL (ref 0.6–1.3)

## 2023-09-28 ENCOUNTER — HOSPITAL ENCOUNTER (OUTPATIENT)
Dept: CT IMAGING | Facility: HOSPITAL | Age: 58
Discharge: HOME | End: 2023-09-28
Admitting: NURSE PRACTITIONER
Payer: COMMERCIAL

## 2023-09-28 DIAGNOSIS — R91.1 SOLITARY PULMONARY NODULE: ICD-10-CM

## 2023-09-28 PROCEDURE — 71250 CT THORAX DX C-: CPT

## 2023-09-29 ENCOUNTER — HOSPITAL ENCOUNTER (OUTPATIENT)
Dept: CT IMAGING | Facility: HOSPITAL | Age: 58
Discharge: HOME | End: 2023-09-29
Payer: COMMERCIAL

## 2023-09-29 VITALS
DIASTOLIC BLOOD PRESSURE: 62 MMHG | HEIGHT: 64 IN | WEIGHT: 230 LBS | RESPIRATION RATE: 20 BRPM | OXYGEN SATURATION: 100 % | HEART RATE: 71 BPM | BODY MASS INDEX: 39.27 KG/M2 | TEMPERATURE: 99.3 F | SYSTOLIC BLOOD PRESSURE: 130 MMHG

## 2023-09-29 DIAGNOSIS — R07.2 PRECORDIAL PAIN: ICD-10-CM

## 2023-09-29 LAB
CREAT BLDA-MCNC: 0.6 MG/DL (ref 0.6–1.3)
QT INTERVAL: 395 MS
QTC INTERVAL: 462 MS

## 2023-09-29 PROCEDURE — 82565 ASSAY OF CREATININE: CPT

## 2023-09-29 PROCEDURE — 75574 CT ANGIO HRT W/3D IMAGE: CPT

## 2023-09-29 PROCEDURE — 93005 ELECTROCARDIOGRAM TRACING: CPT | Performed by: INTERNAL MEDICINE

## 2023-09-29 PROCEDURE — 75574 CT ANGIO HRT W/3D IMAGE: CPT | Performed by: INTERNAL MEDICINE

## 2023-09-29 PROCEDURE — 93010 ELECTROCARDIOGRAM REPORT: CPT | Performed by: INTERNAL MEDICINE

## 2023-09-29 PROCEDURE — 25510000001 IOPAMIDOL PER 1 ML: Performed by: INTERNAL MEDICINE

## 2023-09-29 RX ORDER — METOPROLOL TARTRATE 5 MG/5ML
5 INJECTION INTRAVENOUS
Status: DISCONTINUED | OUTPATIENT
Start: 2023-09-29 | End: 2023-09-30 | Stop reason: HOSPADM

## 2023-09-29 RX ORDER — NITROGLYCERIN 0.4 MG/1
0.8 TABLET SUBLINGUAL ONCE
Status: COMPLETED | OUTPATIENT
Start: 2023-09-29 | End: 2023-09-29

## 2023-09-29 RX ORDER — LEVOTHYROXINE SODIUM 0.05 MG/1
TABLET ORAL
COMMUNITY

## 2023-09-29 RX ORDER — OMEPRAZOLE 20 MG/1
20 CAPSULE, DELAYED RELEASE ORAL DAILY
COMMUNITY

## 2023-09-29 RX ADMIN — METOPROLOL TARTRATE 5 MG: 1 INJECTION, SOLUTION INTRAVENOUS at 12:56

## 2023-09-29 RX ADMIN — IOPAMIDOL 95 ML: 755 INJECTION, SOLUTION INTRAVENOUS at 13:30

## 2023-09-29 RX ADMIN — NITROGLYCERIN 0.8 MG: 0.4 TABLET SUBLINGUAL at 13:29

## 2023-09-29 RX ADMIN — METOPROLOL TARTRATE 5 MG: 1 INJECTION, SOLUTION INTRAVENOUS at 13:04

## 2023-09-29 NOTE — NURSING NOTE
Spoke with DELVIS Schumacher with Ania Burton at [facility]. EKG HR is 82 and BP is 149/87. Orders for Metoprolol and Nitroglycerin protocol received.

## 2023-10-02 ENCOUNTER — HOSPITAL ENCOUNTER (OUTPATIENT)
Dept: ULTRASOUND IMAGING | Facility: HOSPITAL | Age: 58
Discharge: HOME | End: 2023-10-02
Admitting: NURSE PRACTITIONER
Payer: COMMERCIAL

## 2023-10-02 DIAGNOSIS — R74.8 ABNORMAL LEVELS OF OTHER SERUM ENZYMES: ICD-10-CM

## 2023-10-02 PROCEDURE — 76705 ECHO EXAM OF ABDOMEN: CPT

## 2023-10-03 ENCOUNTER — TELEPHONE (OUTPATIENT)
Dept: CARDIOLOGY | Facility: CLINIC | Age: 58
End: 2023-10-03

## 2023-10-03 ENCOUNTER — DOCUMENTATION (OUTPATIENT)
Dept: CARDIOLOGY | Facility: CLINIC | Age: 58
End: 2023-10-03
Payer: COMMERCIAL

## 2023-10-03 NOTE — PROGRESS NOTES
Cardiac CTA with morphology  Imaging done 9/29/23  Reason for the exam: chest pain    Study quality is poor-mid to distal LAD not well visualized, artifact in the mid RCA from motion    Calcium score is 168 Agatston units.  This is between the  percentile for women between the ages of 55-59 years old.    Heart rate 70 bpm.  Left ventricular end-diastolic volume 114 mL.  Left ventricular end-systolic volume 19 mL.  Ejection fraction 84%.  Stroke volume 95 mL.  Cardiac output 6.66 L/m    The right atrium is normal in size.  The right ventricle is normal in size.  There is grossly normal right ventricular systolic function.  The pulmonary artery is normal in size.  There are 5 pulmonary veins which enter the left atrium in their expected location, 3 on the right and 2 on the left.  The left atrial appendage was visualized and is without thrombus.  The intra-atrial septum appeared to be intact.  The left ventricle is normal in size with normal systolic function.  There was no evidence of a left ventricular thrombus.  There is mild to moderate left ventricular hypertrophy.  The intraventricular septum appeared to be intact.  The mitral valve appeared structurally normal.  The aortic valve was trileaflet and appears structurally and functionally normal.  The pulmonic valve appeared structurally normal.  The tricuspid valve appeared structurally normal.  There was no pericardial effusion.  The pericardium appeared normal.    The left main coronary artery came off the left coronary cusp in its anticipated location.  It bifurcated into the left anterior descending artery and the circumflex coronary artery.  There was no evidence of atherosclerotic disease of the left main coronary artery.  Left anterior descending artery wraps around the apex of the heart.  There is evidence of atherosclerotic disease, 25-50% calcified proximal LAD stenosis,>50% calcified mid LAD stenosis - vessel is possibly occluded beyond this and  this vessel is small caliber in the midportion.  There is a small diagonal 1 branch with 50% calcified proximal stenosis.  The circumflex artery is the nondominant vessel with evidence of atherosclerotic disease, 25-50% calcified proximal stenosis.  The right coronary artery is the dominant vessel with evidence of atherosclerotic disease, <25% calcified proximal stenosis,  <25% calcified mid vessel stenosis, 50-75% calcified PDA stenosis.    Conclusions:  1.  Normal coronary artery anatomy with evidence of atherosclerotic disease-see above notes.  Calcium score is 168 Agatston units.  2.  Structurally normal heart, left ventricle hypertrophy noted.  3.  Quality of the study is somewhat poor      Lynn Mills MD  10/03/23

## 2023-10-04 ENCOUNTER — TELEPHONE (OUTPATIENT)
Dept: CARDIOLOGY | Facility: CLINIC | Age: 58
End: 2023-10-04
Payer: COMMERCIAL

## 2023-10-04 RX ORDER — ISOSORBIDE MONONITRATE 30 MG/1
30 TABLET, EXTENDED RELEASE ORAL DAILY
Qty: 30 TABLET | Refills: 11 | Status: SHIPPED | OUTPATIENT
Start: 2023-10-04

## 2023-10-04 NOTE — TELEPHONE ENCOUNTER
Questionable borderline lesion in the mid LAD.  Patient has not been having as much of the atypical chest discomfort and would choose to move forward with medical therapy rather that any more aggressive testing at this time.  If she fails medical therapy we could always consider cardiac catheterization.  A lung nodule was noted during the study and she is slated to see a pulmonologist next week.  I will defer to pulmonology for surveillance testing of any lung pathology.

## 2023-10-19 ENCOUNTER — TRANSCRIBE ORDERS (OUTPATIENT)
Dept: ADMINISTRATIVE | Facility: HOSPITAL | Age: 58
End: 2023-10-19
Payer: COMMERCIAL

## 2023-10-19 ENCOUNTER — TELEPHONE (OUTPATIENT)
Dept: CARDIOLOGY | Facility: CLINIC | Age: 58
End: 2023-10-19
Payer: COMMERCIAL

## 2023-10-19 DIAGNOSIS — I20.0 UNSTABLE ANGINA: Primary | ICD-10-CM

## 2023-10-19 DIAGNOSIS — R06.00 DYSPNEA, UNSPECIFIED TYPE: Primary | ICD-10-CM

## 2023-10-19 NOTE — TELEPHONE ENCOUNTER
Patient had CT coronary angiogram with questionable obstructive lesion in the LAD.  After 12 days of therapy she continues to have chest pain.  We will plan for cardiac catheterization.

## 2023-10-20 ENCOUNTER — TRANSCRIBE ORDERS (OUTPATIENT)
Dept: CARDIOLOGY | Facility: CLINIC | Age: 58
End: 2023-10-20
Payer: COMMERCIAL

## 2023-10-20 ENCOUNTER — LAB (OUTPATIENT)
Dept: LAB | Facility: HOSPITAL | Age: 58
End: 2023-10-20
Payer: COMMERCIAL

## 2023-10-20 ENCOUNTER — TRANSCRIBE ORDERS (OUTPATIENT)
Dept: LAB | Facility: HOSPITAL | Age: 58
End: 2023-10-20
Payer: COMMERCIAL

## 2023-10-20 ENCOUNTER — HOSPITAL ENCOUNTER (OUTPATIENT)
Facility: HOSPITAL | Age: 58
Setting detail: HOSPITAL OUTPATIENT SURGERY
Discharge: HOME | End: 2023-10-20
Attending: INTERNAL MEDICINE | Admitting: INTERNAL MEDICINE
Payer: COMMERCIAL

## 2023-10-20 VITALS
TEMPERATURE: 98.2 F | BODY MASS INDEX: 38.41 KG/M2 | RESPIRATION RATE: 16 BRPM | WEIGHT: 225 LBS | SYSTOLIC BLOOD PRESSURE: 108 MMHG | DIASTOLIC BLOOD PRESSURE: 68 MMHG | HEIGHT: 64 IN | OXYGEN SATURATION: 97 % | HEART RATE: 61 BPM

## 2023-10-20 DIAGNOSIS — Z01.810 PRE-OPERATIVE CARDIOVASCULAR EXAMINATION: ICD-10-CM

## 2023-10-20 DIAGNOSIS — Z13.6 SCREENING FOR ISCHEMIC HEART DISEASE: ICD-10-CM

## 2023-10-20 DIAGNOSIS — R06.00 DYSPNEA, UNSPECIFIED TYPE: Primary | ICD-10-CM

## 2023-10-20 DIAGNOSIS — I20.0 UNSTABLE ANGINA: ICD-10-CM

## 2023-10-20 DIAGNOSIS — Z01.810 PRE-OPERATIVE CARDIOVASCULAR EXAMINATION: Primary | ICD-10-CM

## 2023-10-20 DIAGNOSIS — R06.00 DYSPNEA, UNSPECIFIED TYPE: ICD-10-CM

## 2023-10-20 LAB
ANION GAP SERPL CALCULATED.3IONS-SCNC: 12.4 MMOL/L (ref 5–15)
BASOPHILS # BLD AUTO: 0.02 10*3/MM3 (ref 0–0.2)
BASOPHILS NFR BLD AUTO: 0.3 % (ref 0–1.5)
BUN SERPL-MCNC: 16 MG/DL (ref 6–20)
BUN/CREAT SERPL: 23.5 (ref 7–25)
CALCIUM SPEC-SCNC: 9.6 MG/DL (ref 8.6–10.5)
CHLORIDE SERPL-SCNC: 99 MMOL/L (ref 98–107)
CHROMATIN AB SERPL-ACNC: <10 IU/ML (ref 0–14)
CO2 SERPL-SCNC: 26.6 MMOL/L (ref 22–29)
CREAT SERPL-MCNC: 0.68 MG/DL (ref 0.57–1)
CRP SERPL-MCNC: <0.3 MG/DL (ref 0–0.5)
DEPRECATED RDW RBC AUTO: 43.6 FL (ref 37–54)
EGFRCR SERPLBLD CKD-EPI 2021: 102.4 ML/MIN/1.73
EOSINOPHIL # BLD AUTO: 0.25 10*3/MM3 (ref 0–0.4)
EOSINOPHIL NFR BLD AUTO: 4.1 % (ref 0.3–6.2)
ERYTHROCYTE [DISTWIDTH] IN BLOOD BY AUTOMATED COUNT: 14.5 % (ref 12.3–15.4)
ERYTHROCYTE [SEDIMENTATION RATE] IN BLOOD: 18 MM/HR (ref 0–30)
GLUCOSE SERPL-MCNC: 137 MG/DL (ref 65–99)
HCT VFR BLD AUTO: 44.6 % (ref 34–46.6)
HGB BLD-MCNC: 14.8 G/DL (ref 12–15.9)
IMM GRANULOCYTES # BLD AUTO: 0.02 10*3/MM3 (ref 0–0.05)
IMM GRANULOCYTES NFR BLD AUTO: 0.3 % (ref 0–0.5)
LYMPHOCYTES # BLD AUTO: 1.69 10*3/MM3 (ref 0.7–3.1)
LYMPHOCYTES NFR BLD AUTO: 27.7 % (ref 19.6–45.3)
MCH RBC QN AUTO: 27.3 PG (ref 26.6–33)
MCHC RBC AUTO-ENTMCNC: 33.2 G/DL (ref 31.5–35.7)
MCV RBC AUTO: 82.1 FL (ref 79–97)
MONOCYTES # BLD AUTO: 0.5 10*3/MM3 (ref 0.1–0.9)
MONOCYTES NFR BLD AUTO: 8.2 % (ref 5–12)
NEUTROPHILS NFR BLD AUTO: 3.63 10*3/MM3 (ref 1.7–7)
NEUTROPHILS NFR BLD AUTO: 59.4 % (ref 42.7–76)
NRBC BLD AUTO-RTO: 0 /100 WBC (ref 0–0.2)
PLATELET # BLD AUTO: 204 10*3/MM3 (ref 140–450)
PMV BLD AUTO: 9.7 FL (ref 6–12)
POTASSIUM SERPL-SCNC: 3.8 MMOL/L (ref 3.5–5.2)
RBC # BLD AUTO: 5.43 10*6/MM3 (ref 3.77–5.28)
SODIUM SERPL-SCNC: 138 MMOL/L (ref 136–145)
WBC NRBC COR # BLD: 6.11 10*3/MM3 (ref 3.4–10.8)

## 2023-10-20 PROCEDURE — 86037 ANCA TITER EACH ANTIBODY: CPT

## 2023-10-20 PROCEDURE — 25010000002 MIDAZOLAM PER 1 MG: Performed by: INTERNAL MEDICINE

## 2023-10-20 PROCEDURE — 93458 L HRT ARTERY/VENTRICLE ANGIO: CPT | Performed by: INTERNAL MEDICINE

## 2023-10-20 PROCEDURE — C1769 GUIDE WIRE: HCPCS | Performed by: INTERNAL MEDICINE

## 2023-10-20 PROCEDURE — 25810000003 SODIUM CHLORIDE 0.9 % SOLUTION: Performed by: INTERNAL MEDICINE

## 2023-10-20 PROCEDURE — C1894 INTRO/SHEATH, NON-LASER: HCPCS | Performed by: INTERNAL MEDICINE

## 2023-10-20 PROCEDURE — 82164 ANGIOTENSIN I ENZYME TEST: CPT

## 2023-10-20 PROCEDURE — 86235 NUCLEAR ANTIGEN ANTIBODY: CPT

## 2023-10-20 PROCEDURE — 86200 CCP ANTIBODY: CPT

## 2023-10-20 PROCEDURE — 36415 COLL VENOUS BLD VENIPUNCTURE: CPT

## 2023-10-20 PROCEDURE — 80048 BASIC METABOLIC PNL TOTAL CA: CPT

## 2023-10-20 PROCEDURE — 85025 COMPLETE CBC W/AUTO DIFF WBC: CPT

## 2023-10-20 PROCEDURE — 86140 C-REACTIVE PROTEIN: CPT

## 2023-10-20 PROCEDURE — 86038 ANTINUCLEAR ANTIBODIES: CPT

## 2023-10-20 PROCEDURE — 25010000002 FENTANYL CITRATE (PF) 50 MCG/ML SOLUTION: Performed by: INTERNAL MEDICINE

## 2023-10-20 PROCEDURE — 25510000001 IOPAMIDOL PER 1 ML: Performed by: INTERNAL MEDICINE

## 2023-10-20 PROCEDURE — 83516 IMMUNOASSAY NONANTIBODY: CPT

## 2023-10-20 PROCEDURE — 25010000002 ONDANSETRON PER 1 MG: Performed by: INTERNAL MEDICINE

## 2023-10-20 PROCEDURE — 85652 RBC SED RATE AUTOMATED: CPT

## 2023-10-20 PROCEDURE — 86431 RHEUMATOID FACTOR QUANT: CPT

## 2023-10-20 PROCEDURE — 25010000002 HEPARIN (PORCINE) PER 1000 UNITS: Performed by: INTERNAL MEDICINE

## 2023-10-20 RX ORDER — SODIUM CHLORIDE 9 MG/ML
100 INJECTION, SOLUTION INTRAVENOUS CONTINUOUS
Status: DISCONTINUED | OUTPATIENT
Start: 2023-10-20 | End: 2023-10-20 | Stop reason: HOSPADM

## 2023-10-20 RX ORDER — SODIUM CHLORIDE 9 MG/ML
75 INJECTION, SOLUTION INTRAVENOUS CONTINUOUS
Status: DISCONTINUED | OUTPATIENT
Start: 2023-10-20 | End: 2023-10-20 | Stop reason: HOSPADM

## 2023-10-20 RX ORDER — HYDROCODONE BITARTRATE AND ACETAMINOPHEN 5; 325 MG/1; MG/1
1 TABLET ORAL EVERY 4 HOURS PRN
Status: DISCONTINUED | OUTPATIENT
Start: 2023-10-20 | End: 2023-10-20 | Stop reason: HOSPADM

## 2023-10-20 RX ORDER — ONDANSETRON 2 MG/ML
INJECTION INTRAMUSCULAR; INTRAVENOUS
Status: DISCONTINUED | OUTPATIENT
Start: 2023-10-20 | End: 2023-10-20 | Stop reason: HOSPADM

## 2023-10-20 RX ORDER — MIDAZOLAM HYDROCHLORIDE 1 MG/ML
INJECTION INTRAMUSCULAR; INTRAVENOUS
Status: DISCONTINUED | OUTPATIENT
Start: 2023-10-20 | End: 2023-10-20 | Stop reason: HOSPADM

## 2023-10-20 RX ORDER — SODIUM CHLORIDE 0.9 % (FLUSH) 0.9 %
10 SYRINGE (ML) INJECTION EVERY 12 HOURS SCHEDULED
Status: DISCONTINUED | OUTPATIENT
Start: 2023-10-20 | End: 2023-10-20 | Stop reason: HOSPADM

## 2023-10-20 RX ORDER — NITROGLYCERIN 0.4 MG/1
0.4 TABLET SUBLINGUAL
Status: DISCONTINUED | OUTPATIENT
Start: 2023-10-20 | End: 2023-10-20 | Stop reason: HOSPADM

## 2023-10-20 RX ORDER — ONDANSETRON 4 MG/1
4 TABLET, FILM COATED ORAL EVERY 6 HOURS PRN
Status: DISCONTINUED | OUTPATIENT
Start: 2023-10-20 | End: 2023-10-20 | Stop reason: HOSPADM

## 2023-10-20 RX ORDER — FENTANYL CITRATE 50 UG/ML
INJECTION, SOLUTION INTRAMUSCULAR; INTRAVENOUS
Status: DISCONTINUED | OUTPATIENT
Start: 2023-10-20 | End: 2023-10-20 | Stop reason: HOSPADM

## 2023-10-20 RX ORDER — SODIUM CHLORIDE 9 MG/ML
40 INJECTION, SOLUTION INTRAVENOUS AS NEEDED
Status: DISCONTINUED | OUTPATIENT
Start: 2023-10-20 | End: 2023-10-20 | Stop reason: HOSPADM

## 2023-10-20 RX ORDER — ACETAMINOPHEN 325 MG/1
650 TABLET ORAL EVERY 4 HOURS PRN
Status: DISCONTINUED | OUTPATIENT
Start: 2023-10-20 | End: 2023-10-20 | Stop reason: HOSPADM

## 2023-10-20 RX ORDER — ONDANSETRON 2 MG/ML
4 INJECTION INTRAMUSCULAR; INTRAVENOUS EVERY 6 HOURS PRN
Status: DISCONTINUED | OUTPATIENT
Start: 2023-10-20 | End: 2023-10-20 | Stop reason: HOSPADM

## 2023-10-20 RX ORDER — SODIUM CHLORIDE 0.9 % (FLUSH) 0.9 %
10 SYRINGE (ML) INJECTION AS NEEDED
Status: DISCONTINUED | OUTPATIENT
Start: 2023-10-20 | End: 2023-10-20 | Stop reason: HOSPADM

## 2023-10-20 RX ORDER — HEPARIN SODIUM 1000 [USP'U]/ML
INJECTION, SOLUTION INTRAVENOUS; SUBCUTANEOUS
Status: DISCONTINUED | OUTPATIENT
Start: 2023-10-20 | End: 2023-10-20 | Stop reason: HOSPADM

## 2023-10-20 RX ORDER — VERAPAMIL HYDROCHLORIDE 2.5 MG/ML
INJECTION, SOLUTION INTRAVENOUS
Status: DISCONTINUED | OUTPATIENT
Start: 2023-10-20 | End: 2023-10-20 | Stop reason: HOSPADM

## 2023-10-20 RX ORDER — LIDOCAINE HYDROCHLORIDE 20 MG/ML
INJECTION, SOLUTION INFILTRATION; PERINEURAL
Status: DISCONTINUED | OUTPATIENT
Start: 2023-10-20 | End: 2023-10-20 | Stop reason: HOSPADM

## 2023-10-20 RX ADMIN — SODIUM CHLORIDE 75 ML/HR: 9 INJECTION, SOLUTION INTRAVENOUS at 13:10

## 2023-10-20 NOTE — Clinical Note
Hemostasis started on the right radial artery. R-Band was used in achieving hemostasis. Radial compression device applied to vessel. Hemostasis achieved successfully. Closure device additional comment: TR 18 cc

## 2023-10-20 NOTE — DISCHARGE INSTRUCTIONS
[facility]  [address] [place] [address]    Coronary Angiogram (Radial/Ulnar Approach) After Care    Refer to this sheet in the next few weeks. These instructions provide you with information on caring for yourself after your procedure. Your caregiver may also give you more specific instructions. Your treatment has been planned according to current medical practices, but problems sometimes occur. Call your caregiver if you have any problems or questions after your procedure.    Home Care Instructions:  You may shower the day after the procedure. Remove the bandage (dressing) and gently wash the site with plain soap and water. Gently pat the site dry. You may apply a band aid daily for 2 days if desired.    Do not apply powder or lotion to the site.  Do not submerge the affected site in water for 3 to 5 days or until the site is completely healed.   Do not lift, push or pull anything over 5 pounds for 5 days after your procedure or as directed by your physician.  As a reference, a gallon of milk weighs 8 pounds.   Inspect the site at least twice daily. You may notice some bruising at the site and it may be tender for 1 to 2 weeks.     Increase your fluid intake for the next 2 days.    Keep arm elevated for 24 hours. For the remainder of the day, keep your arm in “Pledge of Allegiance” position when up and about.     You may drive 24 hours after the procedure unless otherwise instructed by your caregiver.  Do not operate machinery or power tools for 24 hours.  A responsible adult should be with you for the first 24 hours after you arrive home. Do not make any important legal decisions or sign legal papers for 24 hours.  Do not drink alcohol for 24 hours.    Metformin or any medications containing Metformin should not be taken for 48 hours after your procedure.      Call Your Doctor if:   You have unusual pain at the radial/ulnar (wrist) site.  You have redness, warmth, swelling, or pain at the  radial/ulnar (wrist) site.  You have drainage (other than a small amount of blood on the dressing).  `You have chills or a fever > 101.  Your arm becomes pale or dark, cool, tingly, or numb.  You develop chest pain, shortness of breath, feel faint or pass out.    You have heavy bleeding from the site, hold pressure on the site for 20 minutes.  If the bleeding stops, apply a fresh bandage and call your cardiologist.  However, if you        continue to have bleeding, call 911 and continue to apply pressure to the site.   You have any symptoms of a stroke.  Remember BE FAST  B-balance. Sudden trouble walking or loss of balance.  E-eyes.  Sudden changes in how you see or a sudden onset of a very bad headache.   F-face. Sudden weakness or loss of feeling of the face or facial droop on one side.   A-arms Sudden weakness or numbness in one arm.  One arm drifts down if they are both held out in front of you. This happens suddenly and usually on one side of the body.   S-speech.  Sudden trouble speaking, slurred speech or trouble understanding what are saying.   T-time  Time to call emergency services.  Write down the symptoms and the time they started.

## 2023-10-21 LAB — CCP IGA+IGG SERPL IA-ACNC: 4 UNITS (ref 0–19)

## 2023-10-23 LAB
ACE SERPL-CCNC: 45 U/L (ref 14–82)
ANA SER QL: NEGATIVE
C-ANCA TITR SER IF: NORMAL TITER
ENA SS-A AB SER-ACNC: 0.3 AI (ref 0–0.9)
ENA SS-B AB SER-ACNC: <0.2 AI (ref 0–0.9)
MYELOPEROXIDASE AB SER IA-ACNC: <0.2 UNITS (ref 0–0.9)
P-ANCA ATYPICAL TITR SER IF: NORMAL TITER
P-ANCA TITR SER IF: NORMAL TITER
PROTEINASE3 AB SER IA-ACNC: <0.2 UNITS (ref 0–0.9)

## 2023-10-30 ENCOUNTER — HOSPITAL ENCOUNTER (OUTPATIENT)
Dept: RESPIRATORY THERAPY | Facility: HOSPITAL | Age: 58
Discharge: HOME | End: 2023-10-30
Admitting: INTERNAL MEDICINE
Payer: COMMERCIAL

## 2023-10-30 DIAGNOSIS — R06.00 DYSPNEA, UNSPECIFIED TYPE: ICD-10-CM

## 2023-10-30 PROCEDURE — 94060 EVALUATION OF WHEEZING: CPT

## 2023-10-30 PROCEDURE — 94640 AIRWAY INHALATION TREATMENT: CPT

## 2023-10-30 PROCEDURE — 94729 DIFFUSING CAPACITY: CPT

## 2023-10-30 PROCEDURE — 94726 PLETHYSMOGRAPHY LUNG VOLUMES: CPT

## 2023-10-30 RX ORDER — ALBUTEROL SULFATE 2.5 MG/3ML
2.5 SOLUTION RESPIRATORY (INHALATION) ONCE
Status: COMPLETED | OUTPATIENT
Start: 2023-10-30 | End: 2023-10-30

## 2023-10-30 RX ADMIN — ALBUTEROL SULFATE 2.5 MG: 2.5 SOLUTION RESPIRATORY (INHALATION) at 15:38

## 2024-01-30 ENCOUNTER — OFFICE VISIT (OUTPATIENT)
Dept: GASTROENTEROLOGY | Facility: CLINIC | Age: 59
End: 2024-01-30
Payer: COMMERCIAL

## 2024-01-30 VITALS
OXYGEN SATURATION: 99 % | WEIGHT: 223.1 LBS | HEIGHT: 64 IN | BODY MASS INDEX: 38.09 KG/M2 | TEMPERATURE: 97.8 F | HEART RATE: 82 BPM

## 2024-01-30 DIAGNOSIS — K44.9 HIATAL HERNIA: ICD-10-CM

## 2024-01-30 DIAGNOSIS — E66.01 CLASS 2 SEVERE OBESITY WITH SERIOUS COMORBIDITY AND BODY MASS INDEX (BMI) OF 38.0 TO 38.9 IN ADULT, UNSPECIFIED OBESITY TYPE: ICD-10-CM

## 2024-01-30 DIAGNOSIS — K21.9 GASTROESOPHAGEAL REFLUX DISEASE WITHOUT ESOPHAGITIS: ICD-10-CM

## 2024-01-30 DIAGNOSIS — R74.8 ELEVATED LIVER ENZYMES: Primary | ICD-10-CM

## 2024-01-30 PROCEDURE — 99204 OFFICE O/P NEW MOD 45 MIN: CPT | Performed by: INTERNAL MEDICINE

## 2024-01-30 RX ORDER — LOSARTAN POTASSIUM AND HYDROCHLOROTHIAZIDE 12.5; 5 MG/1; MG/1
TABLET ORAL
COMMUNITY

## 2024-01-30 RX ORDER — FAMOTIDINE 20 MG/1
20 TABLET, FILM COATED ORAL 2 TIMES DAILY
COMMUNITY
End: 2024-01-30

## 2024-01-30 RX ORDER — OMEPRAZOLE 20 MG/1
40 CAPSULE, DELAYED RELEASE ORAL DAILY
Qty: 60 CAPSULE | Refills: 2 | Status: SHIPPED | OUTPATIENT
Start: 2024-01-30 | End: 2024-04-29

## 2024-01-30 NOTE — PROGRESS NOTES
Chief Complaint   Patient presents with    Abnormal levels of other serum enzymes    Other specified diseases of biliary tract    Abdominal Pain    Nausea    Heartburn     Subjective   HPI  Marii Schuster is a 59 y.o. female who presents today for new patient evaluation    Referred for elevated liver enzymes.  No recent labs currently available for review.  Recent RUQ u/s report reviewed, hepatic steatosis.  Note of CBD 9mm.  Prior CCY  nearly 30yrs ago  CT scan from 9/2023 reviewed, fatty liver.  CBD measures about 8mm per my read    BMI 38.6  On Monjourno for last month  Long standing hx of GERD on Pepcid 20mg BID  Prior EGD 2021 with small HH, mild gastritis  Normal colonoscopy 7 years ago      Objective   Vitals:    01/30/24 1240   Pulse: 82   Temp: 97.8 °F (36.6 °C)   SpO2: 99%     Physical Exam  Vitals reviewed.   Constitutional:       Appearance: She is well-developed.   HENT:      Head: Normocephalic and atraumatic.   Abdominal:      General: Bowel sounds are normal. There is no distension.      Palpations: Abdomen is soft. There is no mass.      Tenderness: There is no abdominal tenderness.      Hernia: No hernia is present.   Skin:     General: Skin is warm and dry.   Neurological:      Mental Status: She is alert and oriented to person, place, and time.   Psychiatric:         Behavior: Behavior normal.         Thought Content: Thought content normal.         Judgment: Judgment normal.       The following data was reviewed by: Jorgito Ugalde MD on 01/30/2024:  Common labs          9/8/2023    12:07 9/29/2023    12:24 10/20/2023    11:17   Common Labs   Glucose   137    BUN   16    Creatinine 0.50  0.60  0.68    Sodium   138    Potassium   3.8    Chloride   99    Calcium   9.6    WBC   6.11    Hemoglobin   14.8    Hematocrit   44.6    Platelets   204      Data reviewed : Radiologic studies US liver and CT a/p as per HPI    Assessment & Plan   Assessment:     1. Elevated liver enzymes    2. Class  2 severe obesity with serious comorbidity and body mass index (BMI) of 38.0 to 38.9 in adult, unspecified obesity type    3. Gastroesophageal reflux disease without esophagitis    4. Hiatal hernia      Plan:   Suspect elevated LFTs secondary to NOGUEIRA.  Discussed importance of weight reduction through dietary modification and exercise  Update LFTs today  Check hepatitis panel  Change from H2 blocker to PPI - rx for omeprazole 40mg/day sent to pts mail order pharmacy    Office f/u 8 weeks  Consider fibrosure based on Fib4 score which we will calculate after LFTs updated          Jorgito Ugalde M.D.  [facility]  [address] [place] [address]  Office: [phone]

## 2024-01-31 LAB
ALBUMIN SERPL-MCNC: 4.2 G/DL (ref 3.5–5.2)
ALBUMIN/GLOB SERPL: 1.2 G/DL
ALP SERPL-CCNC: 68 U/L (ref 39–117)
ALT SERPL-CCNC: 28 U/L (ref 1–33)
AST SERPL-CCNC: 25 U/L (ref 1–32)
BILIRUB SERPL-MCNC: 1 MG/DL (ref 0–1.2)
BUN SERPL-MCNC: 15 MG/DL (ref 6–20)
BUN/CREAT SERPL: 25.4 (ref 7–25)
CALCIUM SERPL-MCNC: 9.7 MG/DL (ref 8.6–10.5)
CHLORIDE SERPL-SCNC: 100 MMOL/L (ref 98–107)
CO2 SERPL-SCNC: 30.6 MMOL/L (ref 22–29)
CREAT SERPL-MCNC: 0.59 MG/DL (ref 0.57–1)
EGFRCR SERPLBLD CKD-EPI 2021: 105.3 ML/MIN/1.73
GLOBULIN SER CALC-MCNC: 3.4 GM/DL
GLUCOSE SERPL-MCNC: 96 MG/DL (ref 65–99)
HAV IGM SERPL QL IA: NEGATIVE
HBV CORE IGM SERPL QL IA: NEGATIVE
HBV SURFACE AG SERPL QL IA: NEGATIVE
HCV AB SERPL QL IA: NORMAL
HCV IGG SERPL QL IA: NON REACTIVE
POTASSIUM SERPL-SCNC: 4 MMOL/L (ref 3.5–5.2)
PROT SERPL-MCNC: 7.6 G/DL (ref 6–8.5)
SODIUM SERPL-SCNC: 140 MMOL/L (ref 136–145)

## 2024-02-01 ENCOUNTER — TELEPHONE (OUTPATIENT)
Dept: GASTROENTEROLOGY | Facility: CLINIC | Age: 59
End: 2024-02-01
Payer: COMMERCIAL

## 2024-02-07 ENCOUNTER — OFFICE VISIT (OUTPATIENT)
Dept: CARDIOLOGY | Facility: CLINIC | Age: 59
End: 2024-02-07
Payer: COMMERCIAL

## 2024-02-07 VITALS
HEART RATE: 79 BPM | BODY MASS INDEX: 38.72 KG/M2 | HEIGHT: 64 IN | DIASTOLIC BLOOD PRESSURE: 78 MMHG | WEIGHT: 226.8 LBS | SYSTOLIC BLOOD PRESSURE: 130 MMHG | OXYGEN SATURATION: 98 %

## 2024-02-07 DIAGNOSIS — I25.10 CORONARY ARTERY DISEASE INVOLVING NATIVE CORONARY ARTERY OF NATIVE HEART WITHOUT ANGINA PECTORIS: Primary | ICD-10-CM

## 2024-02-07 PROCEDURE — 99213 OFFICE O/P EST LOW 20 MIN: CPT | Performed by: INTERNAL MEDICINE

## 2024-02-07 NOTE — PROGRESS NOTES
[facility]      Patient Name: Marii Schuster  :1965  Age: 59 y.o.  Encounter Provider:  Rio Ring Jr, MD      Chief Complaint: Follow-up chest pain        HPI  Marii Schuster is a 59 y.o. female past medical history of hypertension, dyslipidemia, hiatal hernia, NORTH on CPAP, obesity who presents for follow-up evaluation of chest discomfort.      Last clinic visit note: Patient is struggled on and off with chest pain for over a decade.  In  she had cardiac catheterization showing normal coronary arteries per patient report.  She had an echocardiogram at the same time which showed normal EF.  She has daily sharp episodes of chest pain.  No relationship to physical activity.  Fleeting episodes with no associated nausea, diaphoresis or shortness of air.  She wakes up often with palpitations which last anywhere from seconds to minutes.  She has chronic lower extremity edema and has been diagnosed with venous insufficiency approximately 18 to 24 months ago.  She has a very strong family history of coronary artery disease.  Father diagnosed in his 40s with coronary artery disease and status post CABG.  Fatal MI at age 62.  Brother with heart attack at age 40.  2 other brothers with coronary revascularization.  Sister with nonfatal myocardial infarction at age 60.  She is a former smoker quit in , denies alcohol or illicit drug use.    Coronary calcium score was mildly elevated at 163.  Stress study was performed showing no evidence of myocardial ischemia.  Unfortunately she continues to have atypical chest pain with no clear relationship to physical activity.  She did have a scope done 6 months ago which showed mild gastritis as well as small hiatal hernia.  Her gastroenterologist optimized her acid reduction protocol but she has no change in symptoms.  She has fair functional capacity but does not do a whole lot during her normal week.  She denies orthopnea, PND.  She  has chronic stable lower extremity edema which she feels is related to venous insufficiency.  Blood pressure is upper limits of normal today in clinic.  Resting heart rate remains elevated.  Social family history was reviewed and is not pertinent to this clinic visit.  She is tolerating statin well but stopped taking aspirin.  She is not sure why the aspirin was stopped but she has been started on iron sulfate since last visit.    Calcium score of 163 with what was concerning for moderate to severe obstructive disease in the LAD.  Sent for cardiac catheterization with nonobstructive disease.  Chest pain has very atypical characteristics and is being worked up by GI for noncardiac etiology.  They just changed her acid reduction medication and she has follow-up with them in a few weeks.  Fair functional capacity with no limiting symptoms.  No orthopnea, PND or edema.  No palpitations, dizziness syncope.  No cardiac complaints at time of interview.    The following portions of the patient's history were reviewed and updated as appropriate: allergies, current medications, past family history, past medical history, past social history, past surgical history and problem list.      Review of Systems   Constitutional: Negative for chills and fever.   HENT:  Negative for hoarse voice and sore throat.    Eyes:  Negative for double vision and photophobia.   Cardiovascular:  Positive for chest pain, leg swelling and palpitations. Negative for near-syncope, orthopnea, paroxysmal nocturnal dyspnea and syncope.   Respiratory:  Negative for cough and wheezing.    Skin:  Negative for poor wound healing and rash.   Musculoskeletal:  Negative for arthritis and joint swelling.   Gastrointestinal:  Negative for bloating, abdominal pain, hematemesis and hematochezia.   Neurological:  Negative for dizziness and focal weakness.   Psychiatric/Behavioral:  Negative for depression and suicidal ideas.      OBJECTIVE:   Vital Signs  Vitals:     "02/07/24 1157   BP: 130/78   Pulse: 79   SpO2: 98%     Estimated body mass index is 38.93 kg/m² as calculated from the following:    Height as of this encounter: 162.6 cm (64\").    Weight as of this encounter: 103 kg (226 lb 12.8 oz).    Vitals reviewed.   Constitutional:       Appearance: Healthy appearance. Not in distress.   Neck:      Vascular: No JVR. JVD normal.   Pulmonary:      Effort: Pulmonary effort is normal.      Breath sounds: Normal breath sounds. No wheezing. No rhonchi. No rales.   Chest:      Chest wall: Not tender to palpatation.   Cardiovascular:      PMI at left midclavicular line. Normal rate. Regular rhythm. Normal S1. Normal S2.       Murmurs: There is no murmur.      No gallop.  No click. No rub.   Pulses:     Intact distal pulses.   Edema:     Peripheral edema absent.   Abdominal:      General: Bowel sounds are normal.      Palpations: Abdomen is soft.      Tenderness: There is no abdominal tenderness.   Musculoskeletal: Normal range of motion.         General: No tenderness. Skin:     General: Skin is warm and dry.   Neurological:      General: No focal deficit present.      Mental Status: Alert and oriented to person, place and time.       Procedures          ASSESSMENT:     Atypical chest pain  Palpitations  Family history of premature coronary artery disease  Hiatal hernia  NORTH on CPAP  Hypertension  Dyslipidemia    PLAN OF CARE:     Coronary artery disease -start aspirin and continue statin.  We will stop isosorbide mononitrate.  GI workup for noncardiac causes of chest pain.  Palpitations - 48-hour Holter monitor showed no evidence of sustained arrhythmia.  Symptoms seem to have resolved.  Family history of premature coronary artery disease and sudden cardiac death  Hiatal hernia  NORTH on CPAP  Hypertension   dyslipidemia    Return to clinic 12 months             Discharge Medications            Accurate as of February 7, 2024 12:00 PM. If you have any questions, ask your nurse or " doctor.                Continue These Medications        Instructions Start Date   atorvastatin 40 MG tablet  Commonly known as: LIPITOR   TAKE 1 TABLET EVERY NIGHT      fish oil 1000 MG capsule capsule   Oral      hydroCHLOROthiazide 12.5 MG capsule  Commonly known as: MICROZIDE   12.5 mg, Oral, Daily      isosorbide mononitrate 30 MG 24 hr tablet  Commonly known as: IMDUR   30 mg, Oral, Daily      levothyroxine 50 MCG tablet  Commonly known as: SYNTHROID, LEVOTHROID   levothyroxine 50 mcg oral tablet take 1 tablet (50 mcg) by oral route once daily   Active      losartan 50 MG tablet  Commonly known as: COZAAR   50 mg, Oral, Daily      losartan-hydrochlorothiazide 50-12.5 MG per tablet  Commonly known as: HYZAAR   losartan-hydrochlorothiazide 50-12.5 mg oral tablet take 1 tablet by oral route once daily   Active      metoprolol succinate XL 50 MG 24 hr tablet  Commonly known as: TOPROL-XL   50 mg, Oral, Daily      Mounjaro 2.5 MG/0.5ML solution pen-injector pen  Generic drug: Tirzepatide   2.5 mg, Subcutaneous, Weekly      omeprazole 20 MG capsule  Commonly known as: priLOSEC   40 mg, Oral, Daily      PEPCID PO   Oral, Daily      vitamin E 100 UNIT capsule   100 Units, Oral, Daily               Thank you for allowing me to participate in the care of your patient,      Sincerely,   Rio Ring MD  [facility]  02/07/24  12:00 EST

## 2024-02-26 ENCOUNTER — TELEPHONE (OUTPATIENT)
Dept: GASTROENTEROLOGY | Facility: CLINIC | Age: 59
End: 2024-02-26
Payer: COMMERCIAL

## 2024-02-26 DIAGNOSIS — R74.8 ELEVATED LIVER ENZYMES: Primary | ICD-10-CM

## 2024-02-26 NOTE — TELEPHONE ENCOUNTER
----- Message from Jorgito Ugalde MD sent at 2/23/2024  1:02 PM EST -----  Normal LFTs, repeat CMP in 3 mos

## 2024-02-26 NOTE — TELEPHONE ENCOUNTER
Pt reviewed results via [employer].     Sent pt [employer] msg advising of results and recommendations. Advised to call if any questions.     Order placed for CMP, sent to Dr Ugalde to cosbutch.

## 2024-05-02 ENCOUNTER — OFFICE VISIT (OUTPATIENT)
Dept: GASTROENTEROLOGY | Facility: CLINIC | Age: 59
End: 2024-05-02
Payer: COMMERCIAL

## 2024-05-02 VITALS
HEIGHT: 64 IN | OXYGEN SATURATION: 93 % | DIASTOLIC BLOOD PRESSURE: 80 MMHG | WEIGHT: 223 LBS | BODY MASS INDEX: 38.07 KG/M2 | SYSTOLIC BLOOD PRESSURE: 135 MMHG | TEMPERATURE: 97.3 F | HEART RATE: 75 BPM

## 2024-05-02 DIAGNOSIS — R74.8 ELEVATED LIVER ENZYMES: Primary | ICD-10-CM

## 2024-05-02 DIAGNOSIS — K76.0 STEATOSIS OF LIVER: ICD-10-CM

## 2024-05-02 DIAGNOSIS — E66.01 CLASS 2 SEVERE OBESITY WITH SERIOUS COMORBIDITY AND BODY MASS INDEX (BMI) OF 38.0 TO 38.9 IN ADULT, UNSPECIFIED OBESITY TYPE: ICD-10-CM

## 2024-05-02 DIAGNOSIS — K21.9 GASTROESOPHAGEAL REFLUX DISEASE WITHOUT ESOPHAGITIS: ICD-10-CM

## 2024-05-02 LAB
ALBUMIN SERPL-MCNC: 4 G/DL (ref 3.5–5.2)
ALBUMIN/GLOB SERPL: 1.1 G/DL
ALP SERPL-CCNC: 75 U/L (ref 39–117)
ALT SERPL-CCNC: 22 U/L (ref 1–33)
AST SERPL-CCNC: 24 U/L (ref 1–32)
BILIRUB SERPL-MCNC: 1 MG/DL (ref 0–1.2)
BUN SERPL-MCNC: 18 MG/DL (ref 6–20)
BUN/CREAT SERPL: 31.6 (ref 7–25)
CALCIUM SERPL-MCNC: 9.7 MG/DL (ref 8.6–10.5)
CHLORIDE SERPL-SCNC: 100 MMOL/L (ref 98–107)
CO2 SERPL-SCNC: 28 MMOL/L (ref 22–29)
CREAT SERPL-MCNC: 0.57 MG/DL (ref 0.57–1)
EGFRCR SERPLBLD CKD-EPI 2021: 106.1 ML/MIN/1.73
ERYTHROCYTE [DISTWIDTH] IN BLOOD BY AUTOMATED COUNT: 14.5 % (ref 12.3–15.4)
GLOBULIN SER CALC-MCNC: 3.6 GM/DL
GLUCOSE SERPL-MCNC: 108 MG/DL (ref 65–99)
HCT VFR BLD AUTO: 45.3 % (ref 34–46.6)
HGB BLD-MCNC: 14.5 G/DL (ref 12–15.9)
MCH RBC QN AUTO: 26.5 PG (ref 26.6–33)
MCHC RBC AUTO-ENTMCNC: 32 G/DL (ref 31.5–35.7)
MCV RBC AUTO: 82.8 FL (ref 79–97)
PLATELET # BLD AUTO: 223 10*3/MM3 (ref 140–450)
POTASSIUM SERPL-SCNC: 4.4 MMOL/L (ref 3.5–5.2)
PROT SERPL-MCNC: 7.6 G/DL (ref 6–8.5)
RBC # BLD AUTO: 5.47 10*6/MM3 (ref 3.77–5.28)
SODIUM SERPL-SCNC: 139 MMOL/L (ref 136–145)
WBC # BLD AUTO: 7.21 10*3/MM3 (ref 3.4–10.8)

## 2024-05-02 RX ORDER — OMEPRAZOLE 40 MG/1
CAPSULE, DELAYED RELEASE ORAL
COMMUNITY
Start: 2024-04-24 | End: 2024-05-02 | Stop reason: SDUPTHER

## 2024-05-02 RX ORDER — OMEPRAZOLE 40 MG/1
40 CAPSULE, DELAYED RELEASE ORAL DAILY
Qty: 90 CAPSULE | Refills: 3 | Status: SHIPPED | OUTPATIENT
Start: 2024-05-02

## 2024-05-02 NOTE — PROGRESS NOTES
"Chief Complaint  Elevated Hepatic Enzymes    Subjective          History of Present Illness    Marii Schuster is a  59 y.o. female presents for follow-up on elevated LFTs.  She is a patient of Dr. Ugalde last seen on 1/30/2024.  She is new to me.    Seen last for elevated LFTs with ultrasound and CT scan showing hepatic steatosis, CBD 8-9 mm postcholecystectomy.  BMI 38.6.  1/30/2024 CMP and acute hepatitis panel were normal.  10/17/2023 labs showed mild elevation in transaminases with AST 47, ALT 45.  Similar findings on 9/14/2023 labs.    Longstanding history of GERD on omeprazole 40 mg daily for atypical chest pain with negative cardiac workup.  Previously failed Pepcid 20 mg twice daily.  2021 EGD with small hiatal hernia, mild gastritis. Doing much better on PPI.     Started on Mounjaro several months ago for weight loss. Tolerating well.  No weight loss from last visit.     1/28/2019 screening colonoscopy with Dr. Walter was unremarkable.  No family history of colon cancer.  Recall 10 years.    She follows with cardiology for CAD, on aspirin and statin.    Objective   Vital Signs:   /80   Pulse 75   Temp 97.3 °F (36.3 °C) (Temporal)   Ht 162.6 cm (64\")   Wt 101 kg (223 lb)   SpO2 93%   BMI 38.28 kg/m²       Physical Exam  Vitals reviewed.   Constitutional:       General: She is awake. She is not in acute distress.     Appearance: Normal appearance. She is well-developed and well-groomed.   HENT:      Head: Normocephalic.   Pulmonary:      Effort: Pulmonary effort is normal. No respiratory distress.   Skin:     Coloration: Skin is not pale.   Neurological:      Mental Status: She is alert and oriented to person, place, and time.      Gait: Gait is intact.   Psychiatric:         Mood and Affect: Mood and affect normal.         Speech: Speech normal.         Behavior: Behavior is cooperative.         Judgment: Judgment normal.          Result Review :             Assessment and Plan  "   Diagnoses and all orders for this visit:    1. Elevated liver enzymes (Primary)  -     CBC (No Diff)  -     Comprehensive Metabolic Panel  -     US Elastography Parenchyma  -     US Derived Fat Fraction    2. Steatosis of liver  -     CBC (No Diff)  -     Comprehensive Metabolic Panel  -     US Elastography Parenchyma  -     US Derived Fat Fraction    3. Class 2 severe obesity with serious comorbidity and body mass index (BMI) of 38.0 to 38.9 in adult, unspecified obesity type    4. Gastroesophageal reflux disease without esophagitis    Other orders  -     omeprazole (priLOSEC) 40 MG capsule; Take 1 capsule by mouth Daily.  Dispense: 90 capsule; Refill: 3    She had a mild elevation in transaminases late 2023 which seem to have normalized on most recent blood work.  She does have a fatty liver with BMI 38.28.  Will recheck labs today including CBC to be able to calculated fib 4 score.    He is not fasting today.  Will go ahead and order elastography for fibrosis scoring.    Encouraged her to work on lifestyle changes with low-fat diet, increased exercise to promote weight loss.  Continue Mounjaro since she is tolerating this well.    Continue omeprazole for GERD.      Follow Up   Return in about 6 months (around 11/2/2024).    Dragon dictation used throughout this note.     Rachelle Arora PA-C

## 2024-06-10 ENCOUNTER — TELEPHONE (OUTPATIENT)
Dept: GASTROENTEROLOGY | Facility: CLINIC | Age: 59
End: 2024-06-10
Payer: COMMERCIAL

## 2024-06-10 NOTE — TELEPHONE ENCOUNTER
6/10/24, attempted to reach pt. Went to . Advised pt to call to schedule for ultrasound or let the office know if she does not want to proceed with testing.

## 2024-08-05 ENCOUNTER — TRANSCRIBE ORDERS (OUTPATIENT)
Dept: ADMINISTRATIVE | Facility: HOSPITAL | Age: 59
End: 2024-08-05
Payer: COMMERCIAL

## 2024-08-05 DIAGNOSIS — M79.604 RIGHT LEG PAIN: Primary | ICD-10-CM

## 2024-08-05 DIAGNOSIS — M79.605 LEFT LEG PAIN: ICD-10-CM

## 2024-08-05 DIAGNOSIS — M79.671 RIGHT FOOT PAIN: ICD-10-CM

## 2024-08-05 DIAGNOSIS — M79.672 LEFT FOOT PAIN: ICD-10-CM

## 2024-08-12 ENCOUNTER — HOSPITAL ENCOUNTER (OUTPATIENT)
Dept: CARDIOLOGY | Facility: HOSPITAL | Age: 59
Discharge: HOME | End: 2024-08-12
Admitting: NURSE PRACTITIONER
Payer: COMMERCIAL

## 2024-08-12 DIAGNOSIS — M79.604 RIGHT LEG PAIN: ICD-10-CM

## 2024-08-12 DIAGNOSIS — M79.605 LEFT LEG PAIN: ICD-10-CM

## 2024-08-12 DIAGNOSIS — M79.672 LEFT FOOT PAIN: ICD-10-CM

## 2024-08-12 DIAGNOSIS — M79.671 RIGHT FOOT PAIN: ICD-10-CM

## 2024-08-12 LAB
BH CV LEFT LOWER VAS GSV BELOW KNEE REFLUX TIME: 1.3 SEC
BH CV LEFT LOWER VAS GSV KNEE REFLUX TIME: 2 SEC
BH CV LEFT LOWER VAS GSV KNEE TRANSVERSE DIAMETER: 0.37 CM
BH CV LEFT LOWER VAS GSV PROX TRANSVERSE DIAMETER: 0.27 CM
BH CV LEFT LOWER VAS SAPHENOFEMORAL JUNCTION REFLUX TIME: 3.5 SEC
BH CV LEFT LOWER VAS SAPHENOFEMORAL JUNCTION TRANSVERSE DIAMETER: 0.47 CM
BH CV LEFT LOWER VAS SSV PROX CALF TRANS DIAMETER: 0.28 CM
BH CV LOW VAS LEFT GREATER SAPH BK VESSEL: 1
BH CV LOW VAS LEFT GSV DIST THIGH VESSEL: 1
BH CV LOW VAS LEFT SAPHENOFEM VESSEL: 1
BH CV LOW VAS RIGHT GREATER SAPH BK VESSEL: 1
BH CV LOWER VAS LEFT GSV DIST THIGH COMPRESSIBILTY: NORMAL
BH CV LOWER VAS RIGHT GSV DIST THIGH COMPRESSIBILTY: NORMAL
BH CV LOWER VASCULAR LEFT COMMON FEMORAL AUGMENT: NORMAL
BH CV LOWER VASCULAR LEFT COMMON FEMORAL COMPETENT: NORMAL
BH CV LOWER VASCULAR LEFT COMMON FEMORAL COMPRESS: NORMAL
BH CV LOWER VASCULAR LEFT COMMON FEMORAL PHASIC: NORMAL
BH CV LOWER VASCULAR LEFT COMMON FEMORAL SPONT: NORMAL
BH CV LOWER VASCULAR LEFT DISTAL FEMORAL COMPRESS: NORMAL
BH CV LOWER VASCULAR LEFT GASTRONEMIUS COMPRESS: NORMAL
BH CV LOWER VASCULAR LEFT GREATER SAPH AK COMPETENT: NORMAL
BH CV LOWER VASCULAR LEFT GREATER SAPH AK COMPRESS: NORMAL
BH CV LOWER VASCULAR LEFT GREATER SAPH BK COMPETENT: NORMAL
BH CV LOWER VASCULAR LEFT GREATER SAPH BK COMPRESS: NORMAL
BH CV LOWER VASCULAR LEFT GSV DIST THIGH COMPETENT: NORMAL
BH CV LOWER VASCULAR LEFT MID FEMORAL AUGMENT: NORMAL
BH CV LOWER VASCULAR LEFT MID FEMORAL COMPETENT: NORMAL
BH CV LOWER VASCULAR LEFT MID FEMORAL COMPRESS: NORMAL
BH CV LOWER VASCULAR LEFT MID FEMORAL PHASIC: NORMAL
BH CV LOWER VASCULAR LEFT MID FEMORAL SPONT: NORMAL
BH CV LOWER VASCULAR LEFT PERONEAL COMPRESS: NORMAL
BH CV LOWER VASCULAR LEFT POPLITEAL AUGMENT: NORMAL
BH CV LOWER VASCULAR LEFT POPLITEAL COMPETENT: NORMAL
BH CV LOWER VASCULAR LEFT POPLITEAL COMPRESS: NORMAL
BH CV LOWER VASCULAR LEFT POPLITEAL PHASIC: NORMAL
BH CV LOWER VASCULAR LEFT POPLITEAL SPONT: NORMAL
BH CV LOWER VASCULAR LEFT POSTERIOR TIBIAL COMPRESS: NORMAL
BH CV LOWER VASCULAR LEFT PROFUNDA FEMORAL COMPRESS: NORMAL
BH CV LOWER VASCULAR LEFT PROXIMAL FEMORAL COMPRESS: NORMAL
BH CV LOWER VASCULAR LEFT SAPHENOFEMORAL JUNCTION AUGMENT: NORMAL
BH CV LOWER VASCULAR LEFT SAPHENOFEMORAL JUNCTION COMPETENT: NORMAL
BH CV LOWER VASCULAR LEFT SAPHENOFEMORAL JUNCTION COMPRESS: NORMAL
BH CV LOWER VASCULAR LEFT SAPHENOFEMORAL JUNCTION PHASIC: NORMAL
BH CV LOWER VASCULAR LEFT SAPHENOFEMORAL JUNCTION SPONT: NORMAL
BH CV LOWER VASCULAR LEFT SSV MID CALF COMPETENT: NORMAL
BH CV LOWER VASCULAR LEFT SSV MID CALF COMPRESS: NORMAL
BH CV LOWER VASCULAR RIGHT COMMON FEMORAL AUGMENT: NORMAL
BH CV LOWER VASCULAR RIGHT COMMON FEMORAL COMPETENT: NORMAL
BH CV LOWER VASCULAR RIGHT COMMON FEMORAL COMPRESS: NORMAL
BH CV LOWER VASCULAR RIGHT COMMON FEMORAL PHASIC: NORMAL
BH CV LOWER VASCULAR RIGHT COMMON FEMORAL SPONT: NORMAL
BH CV LOWER VASCULAR RIGHT DISTAL FEMORAL COMPRESS: NORMAL
BH CV LOWER VASCULAR RIGHT GASTRONEMIUS COMPRESS: NORMAL
BH CV LOWER VASCULAR RIGHT GREATER SAPH AK COMPETENT: NORMAL
BH CV LOWER VASCULAR RIGHT GREATER SAPH BK COMPETENT: NORMAL
BH CV LOWER VASCULAR RIGHT GREATER SAPH BK COMPRESS: NORMAL
BH CV LOWER VASCULAR RIGHT GSV DIST THIGH COMPETENT: NORMAL
BH CV LOWER VASCULAR RIGHT LESSER SAPH COMPETENT: NORMAL
BH CV LOWER VASCULAR RIGHT LESSER SAPH COMPRESS: NORMAL
BH CV LOWER VASCULAR RIGHT MID FEMORAL AUGMENT: NORMAL
BH CV LOWER VASCULAR RIGHT MID FEMORAL COMPETENT: NORMAL
BH CV LOWER VASCULAR RIGHT MID FEMORAL COMPRESS: NORMAL
BH CV LOWER VASCULAR RIGHT MID FEMORAL PHASIC: NORMAL
BH CV LOWER VASCULAR RIGHT MID FEMORAL SPONT: NORMAL
BH CV LOWER VASCULAR RIGHT PERONEAL COMPRESS: NORMAL
BH CV LOWER VASCULAR RIGHT POPLITEAL AUGMENT: NORMAL
BH CV LOWER VASCULAR RIGHT POPLITEAL COMPETENT: NORMAL
BH CV LOWER VASCULAR RIGHT POPLITEAL COMPRESS: NORMAL
BH CV LOWER VASCULAR RIGHT POPLITEAL PHASIC: NORMAL
BH CV LOWER VASCULAR RIGHT POPLITEAL SPONT: NORMAL
BH CV LOWER VASCULAR RIGHT POSTERIOR TIBIAL COMPRESS: NORMAL
BH CV LOWER VASCULAR RIGHT PROFUNDA FEMORAL COMPRESS: NORMAL
BH CV LOWER VASCULAR RIGHT PROXIMAL FEMORAL COMPRESS: NORMAL
BH CV LOWER VASCULAR RIGHT SAPHENOFEMORAL JUNCTION AUGMENT: NORMAL
BH CV LOWER VASCULAR RIGHT SAPHENOFEMORAL JUNCTION COMPETENT: NORMAL
BH CV LOWER VASCULAR RIGHT SAPHENOFEMORAL JUNCTION COMPRESS: NORMAL
BH CV LOWER VASCULAR RIGHT SAPHENOFEMORAL JUNCTION PHASIC: NORMAL
BH CV LOWER VASCULAR RIGHT SAPHENOFEMORAL JUNCTION SPONT: NORMAL
BH CV RIGHT LOWER VAS GSV KNEE TRANS DIAMETER: 0.26 CM
BH CV RIGHT LOWER VAS GSV PROX CALF REFLUX TIME: 8 SEC
BH CV RIGHT LOWER VAS GSV PROX CALF TRANS DIAMETER: 0.27 CM
BH CV RIGHT LOWER VAS GSV PROX THIGH TRANS DIAMETER: 0.29 CM
BH CV RIGHT LOWER VAS SAPHENOFEM JUNCTION TRANSVERSE DIAMETER: 0.48 CM
BH CV RIGHT LOWER VAS SSV PROX CALF TRANS DIAMETER: 0.12 CM
BH CV VAS RIGHT GSV PROXIMAL HIDDEN LRR COMPRESSIBILTY: NORMAL

## 2024-08-12 PROCEDURE — 93970 EXTREMITY STUDY: CPT | Performed by: SURGERY

## 2024-08-12 PROCEDURE — 93970 EXTREMITY STUDY: CPT

## 2024-08-19 ENCOUNTER — HOSPITAL ENCOUNTER (OUTPATIENT)
Facility: HOSPITAL | Age: 59
Discharge: HOME | End: 2024-08-19
Admitting: PHYSICIAN ASSISTANT
Payer: COMMERCIAL

## 2024-08-19 PROCEDURE — 0690T QUAN US TIS CHARAC W/DX US: CPT

## 2024-08-19 PROCEDURE — 76981 USE PARENCHYMA: CPT

## 2025-01-07 ENCOUNTER — TRANSCRIBE ORDERS (OUTPATIENT)
Dept: ADMINISTRATIVE | Facility: HOSPITAL | Age: 60
End: 2025-01-07
Payer: COMMERCIAL

## 2025-01-07 DIAGNOSIS — N64.4 MASTODYNIA: Primary | ICD-10-CM

## 2025-01-24 ENCOUNTER — HOSPITAL ENCOUNTER (OUTPATIENT)
Dept: MAMMOGRAPHY | Facility: HOSPITAL | Age: 60
Discharge: HOME | End: 2025-01-24
Admitting: NURSE PRACTITIONER
Payer: COMMERCIAL

## 2025-01-24 ENCOUNTER — APPOINTMENT (OUTPATIENT)
Dept: ULTRASOUND IMAGING | Facility: HOSPITAL | Age: 60
End: 2025-01-24
Payer: COMMERCIAL

## 2025-01-24 DIAGNOSIS — N64.4 MASTODYNIA: ICD-10-CM

## 2025-01-24 PROCEDURE — G0279 TOMOSYNTHESIS, MAMMO: HCPCS

## 2025-01-24 PROCEDURE — 77066 DX MAMMO INCL CAD BI: CPT

## 2025-02-21 ENCOUNTER — HOSPITAL ENCOUNTER (EMERGENCY)
Facility: HOSPITAL | Age: 60
Discharge: HOME | End: 2025-02-21
Attending: EMERGENCY MEDICINE
Payer: COMMERCIAL

## 2025-02-21 ENCOUNTER — APPOINTMENT (OUTPATIENT)
Dept: CT IMAGING | Facility: HOSPITAL | Age: 60
End: 2025-02-21
Payer: COMMERCIAL

## 2025-02-21 VITALS
WEIGHT: 220 LBS | BODY MASS INDEX: 37.56 KG/M2 | OXYGEN SATURATION: 95 % | RESPIRATION RATE: 15 BRPM | DIASTOLIC BLOOD PRESSURE: 79 MMHG | HEIGHT: 64 IN | TEMPERATURE: 97.9 F | HEART RATE: 93 BPM | SYSTOLIC BLOOD PRESSURE: 169 MMHG

## 2025-02-21 DIAGNOSIS — R10.9 LEFT FLANK PAIN: ICD-10-CM

## 2025-02-21 DIAGNOSIS — E80.6 HYPERBILIRUBINEMIA: ICD-10-CM

## 2025-02-21 DIAGNOSIS — N30.90 CYSTITIS: Primary | ICD-10-CM

## 2025-02-21 LAB
ALBUMIN SERPL-MCNC: 4.2 G/DL (ref 3.5–5.2)
ALBUMIN/GLOB SERPL: 1 G/DL
ALP SERPL-CCNC: 117 U/L (ref 39–117)
ALT SERPL W P-5'-P-CCNC: 40 U/L (ref 1–33)
ANION GAP SERPL CALCULATED.3IONS-SCNC: 13 MMOL/L (ref 5–15)
AST SERPL-CCNC: 37 U/L (ref 1–32)
BACTERIA UR QL AUTO: ABNORMAL /HPF
BASOPHILS # BLD AUTO: 0.02 10*3/MM3 (ref 0–0.2)
BASOPHILS NFR BLD AUTO: 0.2 % (ref 0–1.5)
BILIRUB SERPL-MCNC: 1.4 MG/DL (ref 0–1.2)
BILIRUB UR QL STRIP: NEGATIVE
BUN SERPL-MCNC: 11 MG/DL (ref 6–20)
BUN/CREAT SERPL: 18.6 (ref 7–25)
CALCIUM SPEC-SCNC: 9.8 MG/DL (ref 8.6–10.5)
CHLORIDE SERPL-SCNC: 97 MMOL/L (ref 98–107)
CLARITY UR: CLEAR
CO2 SERPL-SCNC: 25 MMOL/L (ref 22–29)
COLOR UR: YELLOW
CREAT SERPL-MCNC: 0.59 MG/DL (ref 0.57–1)
DEPRECATED RDW RBC AUTO: 42 FL (ref 37–54)
EGFRCR SERPLBLD CKD-EPI 2021: 104.6 ML/MIN/1.73
EOSINOPHIL # BLD AUTO: 0.24 10*3/MM3 (ref 0–0.4)
EOSINOPHIL NFR BLD AUTO: 2.7 % (ref 0.3–6.2)
ERYTHROCYTE [DISTWIDTH] IN BLOOD BY AUTOMATED COUNT: 14.4 % (ref 12.3–15.4)
GLOBULIN UR ELPH-MCNC: 4.4 GM/DL
GLUCOSE SERPL-MCNC: 139 MG/DL (ref 65–99)
GLUCOSE UR STRIP-MCNC: NEGATIVE MG/DL
HCT VFR BLD AUTO: 50.1 % (ref 34–46.6)
HGB BLD-MCNC: 16.3 G/DL (ref 12–15.9)
HGB UR QL STRIP.AUTO: NEGATIVE
HYALINE CASTS UR QL AUTO: ABNORMAL /LPF
IMM GRANULOCYTES # BLD AUTO: 0.03 10*3/MM3 (ref 0–0.05)
IMM GRANULOCYTES NFR BLD AUTO: 0.3 % (ref 0–0.5)
KETONES UR QL STRIP: NEGATIVE
LEUKOCYTE ESTERASE UR QL STRIP.AUTO: ABNORMAL
LIPASE SERPL-CCNC: 11 U/L (ref 13–60)
LYMPHOCYTES # BLD AUTO: 2.75 10*3/MM3 (ref 0.7–3.1)
LYMPHOCYTES NFR BLD AUTO: 30.9 % (ref 19.6–45.3)
MCH RBC QN AUTO: 26.4 PG (ref 26.6–33)
MCHC RBC AUTO-ENTMCNC: 32.5 G/DL (ref 31.5–35.7)
MCV RBC AUTO: 81.2 FL (ref 79–97)
MONOCYTES # BLD AUTO: 0.65 10*3/MM3 (ref 0.1–0.9)
MONOCYTES NFR BLD AUTO: 7.3 % (ref 5–12)
NEUTROPHILS NFR BLD AUTO: 5.21 10*3/MM3 (ref 1.7–7)
NEUTROPHILS NFR BLD AUTO: 58.6 % (ref 42.7–76)
NITRITE UR QL STRIP: NEGATIVE
NRBC BLD AUTO-RTO: 0 /100 WBC (ref 0–0.2)
PH UR STRIP.AUTO: 7 [PH] (ref 5–8)
PLATELET # BLD AUTO: 282 10*3/MM3 (ref 140–450)
PMV BLD AUTO: 9.4 FL (ref 6–12)
POTASSIUM SERPL-SCNC: 3.9 MMOL/L (ref 3.5–5.2)
PROT SERPL-MCNC: 8.6 G/DL (ref 6–8.5)
PROT UR QL STRIP: NEGATIVE
RBC # BLD AUTO: 6.17 10*6/MM3 (ref 3.77–5.28)
RBC # UR STRIP: ABNORMAL /HPF
REF LAB TEST METHOD: ABNORMAL
SODIUM SERPL-SCNC: 135 MMOL/L (ref 136–145)
SP GR UR STRIP: 1.01 (ref 1–1.03)
SQUAMOUS #/AREA URNS HPF: ABNORMAL /HPF
UROBILINOGEN UR QL STRIP: ABNORMAL
WBC # UR STRIP: ABNORMAL /HPF
WBC NRBC COR # BLD AUTO: 8.9 10*3/MM3 (ref 3.4–10.8)

## 2025-02-21 PROCEDURE — 25010000002 KETOROLAC TROMETHAMINE PER 15 MG: Performed by: EMERGENCY MEDICINE

## 2025-02-21 PROCEDURE — 80053 COMPREHEN METABOLIC PANEL: CPT | Performed by: EMERGENCY MEDICINE

## 2025-02-21 PROCEDURE — 81001 URINALYSIS AUTO W/SCOPE: CPT | Performed by: EMERGENCY MEDICINE

## 2025-02-21 PROCEDURE — 85025 COMPLETE CBC W/AUTO DIFF WBC: CPT | Performed by: EMERGENCY MEDICINE

## 2025-02-21 PROCEDURE — 83690 ASSAY OF LIPASE: CPT | Performed by: EMERGENCY MEDICINE

## 2025-02-21 PROCEDURE — 74176 CT ABD & PELVIS W/O CONTRAST: CPT

## 2025-02-21 PROCEDURE — 25810000003 SODIUM CHLORIDE 0.9 % SOLUTION: Performed by: EMERGENCY MEDICINE

## 2025-02-21 PROCEDURE — 99284 EMERGENCY DEPT VISIT MOD MDM: CPT

## 2025-02-21 PROCEDURE — 87086 URINE CULTURE/COLONY COUNT: CPT | Performed by: EMERGENCY MEDICINE

## 2025-02-21 PROCEDURE — 96374 THER/PROPH/DIAG INJ IV PUSH: CPT

## 2025-02-21 RX ORDER — LEVOFLOXACIN 750 MG/1
750 TABLET, FILM COATED ORAL DAILY
Qty: 5 TABLET | Refills: 0 | Status: SHIPPED | OUTPATIENT
Start: 2025-02-21 | End: 2025-02-26

## 2025-02-21 RX ORDER — HYDROCODONE BITARTRATE AND ACETAMINOPHEN 5; 325 MG/1; MG/1
1 TABLET ORAL EVERY 6 HOURS PRN
Qty: 12 TABLET | Refills: 0 | Status: SHIPPED | OUTPATIENT
Start: 2025-02-21 | End: 2025-02-24

## 2025-02-21 RX ORDER — KETOROLAC TROMETHAMINE 15 MG/ML
15 INJECTION, SOLUTION INTRAMUSCULAR; INTRAVENOUS ONCE
Status: COMPLETED | OUTPATIENT
Start: 2025-02-21 | End: 2025-02-21

## 2025-02-21 RX ORDER — ONDANSETRON 4 MG/1
4 TABLET, ORALLY DISINTEGRATING ORAL EVERY 8 HOURS PRN
Qty: 15 TABLET | Refills: 0 | Status: SHIPPED | OUTPATIENT
Start: 2025-02-21 | End: 2025-03-03

## 2025-02-21 RX ORDER — SODIUM CHLORIDE 0.9 % (FLUSH) 0.9 %
10 SYRINGE (ML) INJECTION AS NEEDED
Status: DISCONTINUED | OUTPATIENT
Start: 2025-02-21 | End: 2025-02-21 | Stop reason: HOSPADM

## 2025-02-21 RX ADMIN — SODIUM CHLORIDE 1000 ML: 9 INJECTION, SOLUTION INTRAVENOUS at 10:00

## 2025-02-21 RX ADMIN — KETOROLAC TROMETHAMINE 15 MG: 15 INJECTION, SOLUTION INTRAMUSCULAR; INTRAVENOUS at 10:05

## 2025-02-21 NOTE — ED PROVIDER NOTES
EMERGENCY DEPARTMENT ENCOUNTER    Room Number:  13/13  PCP: Christel Jonas APRN    HPI:  Chief Complaint: Left flank pain  A complete HPI/ROS/PMH/PSH/SH/FH are unobtainable due to: None  Context: Marii Schuster is a 60 y.o. female who presents to the ED c/o acute severe left flank pain.  Onset about 2 days ago.  Pain is constant and sharp and throbbing.  Nonradiating.  No fever, vomiting, urinary symptoms.  This feels like prior kidney stones.        PAST MEDICAL HISTORY  Active Ambulatory Problems     Diagnosis Date Noted    Unstable angina 10/19/2023     Resolved Ambulatory Problems     Diagnosis Date Noted    No Resolved Ambulatory Problems     Past Medical History:   Diagnosis Date    Diabetes mellitus     Fatty liver     GERD (gastroesophageal reflux disease)     Hashimoto's disease     Hernia     Hyperlipidemia     Hypertension     Kidney stones          PAST SURGICAL HISTORY  Past Surgical History:   Procedure Laterality Date    CARDIAC CATHETERIZATION N/A 10/20/2023    Procedure: Left Heart Cath;  Surgeon: Severiano Loya MD;  Location: [facility] CATH INVASIVE LOCATION;  Service: Cardiology;  Laterality: N/A;    CARDIAC CATHETERIZATION N/A 10/20/2023    Procedure: Coronary angiography;  Surgeon: Severiano Loya MD;  Location: [facility] CATH INVASIVE LOCATION;  Service: Cardiology;  Laterality: N/A;    CARDIAC CATHETERIZATION N/A 10/20/2023    Procedure: Left ventriculography;  Surgeon: Severiano Loya MD;  Location: [facility] CATH INVASIVE LOCATION;  Service: Cardiology;  Laterality: N/A;    CHOLECYSTECTOMY      COLONOSCOPY N/A 01/28/2019    Normal, recall 10 years.    CYSTOSCOPY W/ LASER LITHOTRIPSY      DILATATION AND CURETTAGE      UPPER GASTROINTESTINAL ENDOSCOPY N/A 10/25/2021    Small hiatal hernia, mild gastritis         FAMILY HISTORY  Family History   Problem Relation Age of Onset    Hypertension Mother     Diabetes Mother     Colon cancer Mother     Heart attack Father 61         cabg    Heart disease Sister         stents    Hypertension Brother         cabg    Diabetes Brother     Hypertension Brother         cabg    Breast cancer Neg Hx          SOCIAL HISTORY  Social History     Socioeconomic History    Marital status:    Tobacco Use    Smoking status: Former     Current packs/day: 0.00     Average packs/day: 1 pack/day for 4.0 years (4.0 ttl pk-yrs)     Types: Cigarettes     Start date:      Quit date:      Years since quittin.1    Smokeless tobacco: Never   Vaping Use    Vaping status: Never Used   Substance and Sexual Activity    Alcohol use: Not Currently     Comment: caffeine use- coffee    Drug use: Not Currently    Sexual activity: Yes     Partners: Male         ALLERGIES  Egg-derived products and Codeine        REVIEW OF SYSTEMS  Review of Systems     Included in HPI  All systems reviewed and negative except for those discussed in HPI.       PHYSICAL EXAM  ED Triage Vitals [25 0904]   Temp Heart Rate Resp BP SpO2   97.9 °F (36.6 °C) 93 15 -- 95 %      Temp src Heart Rate Source Patient Position BP Location FiO2 (%)   -- -- -- -- --       Physical Exam      GENERAL: no acute distress  HENT: nares patent  EYES: no scleral icterus  CV: regular rhythm, normal rate  RESPIRATORY: normal effort, clear to auscultation bilaterally  ABDOMEN: soft, nontender  MUSCULOSKELETAL: no deformity  NEURO: alert, moves all extremities, follows commands  PSYCH:  calm, cooperative  SKIN: warm, dry    Vital signs and nursing notes reviewed.          LAB RESULTS  Recent Results (from the past 24 hours)   Comprehensive Metabolic Panel    Collection Time: 25 10:02 AM    Specimen: Arm, Right; Blood   Result Value Ref Range    Glucose 139 (H) 65 - 99 mg/dL    BUN 11 6 - 20 mg/dL    Creatinine 0.59 0.57 - 1.00 mg/dL    Sodium 135 (L) 136 - 145 mmol/L    Potassium 3.9 3.5 - 5.2 mmol/L    Chloride 97 (L) 98 - 107 mmol/L    CO2 25.0 22.0 - 29.0 mmol/L    Calcium 9.8 8.6 - 10.5  mg/dL    Total Protein 8.6 (H) 6.0 - 8.5 g/dL    Albumin 4.2 3.5 - 5.2 g/dL    ALT (SGPT) 40 (H) 1 - 33 U/L    AST (SGOT) 37 (H) 1 - 32 U/L    Alkaline Phosphatase 117 39 - 117 U/L    Total Bilirubin 1.4 (H) 0.0 - 1.2 mg/dL    Globulin 4.4 gm/dL    A/G Ratio 1.0 g/dL    BUN/Creatinine Ratio 18.6 7.0 - 25.0    Anion Gap 13.0 5.0 - 15.0 mmol/L    eGFR 104.6 >60.0 mL/min/1.73   Lipase    Collection Time: 02/21/25 10:02 AM    Specimen: Arm, Right; Blood   Result Value Ref Range    Lipase 11 (L) 13 - 60 U/L   Urinalysis With Culture If Indicated - Urine, Clean Catch    Collection Time: 02/21/25 10:02 AM    Specimen: Urine, Clean Catch   Result Value Ref Range    Color, UA Yellow Yellow, Straw    Appearance, UA Clear Clear    pH, UA 7.0 5.0 - 8.0    Specific Gravity, UA 1.012 1.005 - 1.030    Glucose, UA Negative Negative    Ketones, UA Negative Negative    Bilirubin, UA Negative Negative    Blood, UA Negative Negative    Protein, UA Negative Negative    Leuk Esterase, UA Small (1+) (A) Negative    Nitrite, UA Negative Negative    Urobilinogen, UA 0.2 E.U./dL 0.2 - 1.0 E.U./dL   CBC Auto Differential    Collection Time: 02/21/25 10:02 AM    Specimen: Arm, Right; Blood   Result Value Ref Range    WBC 8.90 3.40 - 10.80 10*3/mm3    RBC 6.17 (H) 3.77 - 5.28 10*6/mm3    Hemoglobin 16.3 (H) 12.0 - 15.9 g/dL    Hematocrit 50.1 (H) 34.0 - 46.6 %    MCV 81.2 79.0 - 97.0 fL    MCH 26.4 (L) 26.6 - 33.0 pg    MCHC 32.5 31.5 - 35.7 g/dL    RDW 14.4 12.3 - 15.4 %    RDW-SD 42.0 37.0 - 54.0 fl    MPV 9.4 6.0 - 12.0 fL    Platelets 282 140 - 450 10*3/mm3    Neutrophil % 58.6 42.7 - 76.0 %    Lymphocyte % 30.9 19.6 - 45.3 %    Monocyte % 7.3 5.0 - 12.0 %    Eosinophil % 2.7 0.3 - 6.2 %    Basophil % 0.2 0.0 - 1.5 %    Immature Grans % 0.3 0.0 - 0.5 %    Neutrophils, Absolute 5.21 1.70 - 7.00 10*3/mm3    Lymphocytes, Absolute 2.75 0.70 - 3.10 10*3/mm3    Monocytes, Absolute 0.65 0.10 - 0.90 10*3/mm3    Eosinophils, Absolute 0.24 0.00 -  0.40 10*3/mm3    Basophils, Absolute 0.02 0.00 - 0.20 10*3/mm3    Immature Grans, Absolute 0.03 0.00 - 0.05 10*3/mm3    nRBC 0.0 0.0 - 0.2 /100 WBC   Urinalysis, Microscopic Only - Urine, Clean Catch    Collection Time: 02/21/25 10:02 AM    Specimen: Urine, Clean Catch   Result Value Ref Range    RBC, UA 0-2 None Seen, 0-2 /HPF    WBC, UA 11-20 (A) None Seen, 0-2 /HPF    Bacteria, UA None Seen None Seen /HPF    Squamous Epithelial Cells, UA 3-6 (A) None Seen, 0-2 /HPF    Hyaline Casts, UA None Seen None Seen /LPF    Methodology Automated Microscopy        Ordered the above labs and reviewed the results.        RADIOLOGY  CT Abdomen Pelvis Without Contrast    Result Date: 2/21/2025  CT ABDOMEN AND PELVIS WITHOUT IV CONTRAST  HISTORY: 60-year-old female with left flank pain. Cholecystectomy in the past.  TECHNIQUE: Radiation dose reduction techniques were utilized, including automated exposure control and exposure modulation based on body size. 3 mm images were obtained through the abdomen and pelvis without the administration of IV contrast. Compared with prior CT 09/08/2023.  FINDINGS: 1. There is bilateral nephrolithiasis. There are no ureteral stones or hydronephrosis bilaterally. There are no stones within the urinary bladder. Urinary bladder wall is slightly thickened. Please correlate clinically for acute UTI.  2. There is moderate fatty infiltration of the liver. There is no biliary dilatation. Shotty nodes at the eduard hepatis are stable. Splenic size is normal. Noncontrasted pancreas and right adrenal appear unremarkable. Nodular enlargement of the medial limb of the left adrenal gland is stable. Noncontrasted uterus and adnexa appear unremarkable. There is no acute bowel abnormality. There is no colitis or appendicitis. No free fluid.  3. No evidence for pneumonia or pleural effusions at the visualized lung bases.        Ordered the above noted radiological studies. Reviewed by me in PACS.         MEDICATIONS GIVEN IN ER  Medications   sodium chloride 0.9 % flush 10 mL (has no administration in time range)   sodium chloride 0.9 % bolus 1,000 mL (1,000 mL Intravenous New Bag 2/21/25 1000)   ketorolac (TORADOL) injection 15 mg (15 mg Intravenous Given 2/21/25 1005)         ORDERS PLACED DURING THIS VISIT:  Orders Placed This Encounter   Procedures    Urine Culture - Urine,    CT Abdomen Pelvis Without Contrast    Comprehensive Metabolic Panel    Lipase    Urinalysis With Culture If Indicated - Urine, Clean Catch    CBC Auto Differential    Urinalysis, Microscopic Only - Urine, Clean Catch    Insert Peripheral IV    CBC & Differential         OUTPATIENT MEDICATION MANAGEMENT:  Current Facility-Administered Medications Ordered in Epic   Medication Dose Route Frequency Provider Last Rate Last Admin    sodium chloride 0.9 % flush 10 mL  10 mL Intravenous PRN Octavio Butcher II, MD         Current Outpatient Medications Ordered in Epic   Medication Sig Dispense Refill    atorvastatin (LIPITOR) 40 MG tablet TAKE 1 TABLET EVERY NIGHT 90 tablet 0    hydroCHLOROthiazide (MICROZIDE) 12.5 MG capsule Take 1 capsule by mouth Daily.      HYDROcodone-acetaminophen (NORCO) 5-325 MG per tablet Take 1 tablet by mouth Every 6 (Six) Hours As Needed for Severe Pain for up to 3 days. 12 tablet 0    isosorbide mononitrate (IMDUR) 30 MG 24 hr tablet Take 1 tablet by mouth Daily. 30 tablet 11    levoFLOXacin (LEVAQUIN) 750 MG tablet Take 1 tablet by mouth Daily for 5 days. 5 tablet 0    levothyroxine (SYNTHROID, LEVOTHROID) 50 MCG tablet levothyroxine 50 mcg oral tablet take 1 tablet (50 mcg) by oral route once daily   Active      losartan (COZAAR) 50 MG tablet Take 1 tablet by mouth Daily.      losartan-hydrochlorothiazide (HYZAAR) 50-12.5 MG per tablet       metoprolol succinate XL (TOPROL-XL) 50 MG 24 hr tablet Take 1 tablet by mouth Daily. 90 tablet 3    Omega-3 Fatty Acids (fish oil) 1000 MG capsule capsule Take  by  mouth.      omeprazole (priLOSEC) 40 MG capsule Take 1 capsule by mouth Daily. 90 capsule 3    ondansetron ODT (ZOFRAN-ODT) 4 MG disintegrating tablet Place 1 tablet on the tongue Every 8 (Eight) Hours As Needed for Nausea or Vomiting for up to 5 days. 15 tablet 0    Tirzepatide (Mounjaro) 2.5 MG/0.5ML solution pen-injector pen Inject 0.5 mL under the skin into the appropriate area as directed 1 (One) Time Per Week.      vitamin E 100 UNIT capsule Take 1 capsule by mouth Daily.         PROCEDURES  Procedures          MEDICAL DECISION MAKING, PROGRESS, and CONSULTS    Discussion below represents my analysis of pertinent findings related to patient's condition, differential diagnosis, treatment plan and final disposition.            Differential diagnosis:    Differential diagnosis includes but not limited to:  - hepatobiliary pathology such as cholecystitis, cholangitis, and symptomatic cholelithiasis  - Pancreatitis  - Dyspepsia  - Small bowel obstruction  - Appendicitis  - Diverticulitis  - UTI including pyelonephritis  - Ureteral stone  - Zoster  - Colitis, including infectious and ischemic                   Independent interpretation of labs, radiology studies, and discussions with consultants:  ED Course as of 02/21/25 1114   Fri Feb 21, 2025   0959 CT of the abdomen pelvis independently interpreted by myself.  Bilateral nephrolithiasis.  I see no ureteral stones.  Fatty liver. [TD]   1103 Sodium(!): 135 [TD]   1103 Total Bilirubin(!): 1.4 [TD]      ED Course User Index  [TD] Octavio Butcher II, MD         Patient does have mild the inflamed bladder.  Urinalysis is potentially consistent with a UTI.  Urine cultures pending.  This could be early pyelonephritis.  I will give her Levaquin.  I discussed with her the diagnostic uncertainty.  We discussed return precautions.  She will also follow-up with her PCP regarding her elevated bilirubin.        Clinical Scores:                                    DIAGNOSIS  Final diagnoses:   Cystitis   Left flank pain   Hyperbilirubinemia         DISPOSITION  DISCHARGE    FOLLOW-UP  [facility] EMERGENCY DEPARTMENT  [address] [facility] [address] [phone]  Go to   If symptoms worsen    Christel Jonas APRN [address]  [place] [address] [phone]    Schedule an appointment as soon as possible for a visit in 3 days  if symptoms persist         Medication List        New Prescriptions      HYDROcodone-acetaminophen 5-325 MG per tablet  Commonly known as: NORCO  Take 1 tablet by mouth Every 6 (Six) Hours As Needed for Severe Pain for up to 3 days.     levoFLOXacin 750 MG tablet  Commonly known as: LEVAQUIN  Take 1 tablet by mouth Daily for 5 days.     ondansetron ODT 4 MG disintegrating tablet  Commonly known as: ZOFRAN-ODT  Place 1 tablet on the tongue Every 8 (Eight) Hours As Needed for Nausea or Vomiting for up to 5 days.               Where to Get Your Medications        These medications were sent to [facility]  [address] [facility] [address] [facility] [address]      Hours: Monday to Friday 7 AM to 6 PM, Saturday & Sunday 8 AM to 4:30 PM (Closed 12 PM to 12:30 PM) Phone: [phone]   HYDROcodone-acetaminophen 5-325 MG per tablet  levoFLOXacin 750 MG tablet  ondansetron ODT 4 MG disintegrating tablet             Latest Documented Vital Signs:  As of 11:14 EST  BP- 173/91 HR- 93 Temp- 97.9 °F (36.6 °C) O2 sat- 95%      --    Please note that portions of this were completed with a voice recognition program.       Note Disclaimer: At [facility], we believe that sharing information builds trust and better relationships. You are receiving this note because you are receiving care at [facility] or recently visited. It is possible you will see health information before a provider has talked with you about it. This kind of information can be easy to misunderstand. To help you fully understand what it  means for your health, we urge you to discuss this note with your provider.         Octavio Butcher II, MD  02/21/25 0123

## 2025-02-23 LAB — BACTERIA SPEC AEROBE CULT: NO GROWTH

## 2025-04-04 ENCOUNTER — TELEPHONE (OUTPATIENT)
Dept: GASTROENTEROLOGY | Facility: CLINIC | Age: 60
End: 2025-04-04
Payer: COMMERCIAL

## 2025-04-04 ENCOUNTER — OFFICE VISIT (OUTPATIENT)
Dept: GASTROENTEROLOGY | Facility: CLINIC | Age: 60
End: 2025-04-04
Payer: COMMERCIAL

## 2025-04-04 VITALS
TEMPERATURE: 97.2 F | WEIGHT: 210.8 LBS | BODY MASS INDEX: 35.99 KG/M2 | HEIGHT: 64 IN | HEART RATE: 98 BPM | DIASTOLIC BLOOD PRESSURE: 75 MMHG | SYSTOLIC BLOOD PRESSURE: 117 MMHG

## 2025-04-04 DIAGNOSIS — R68.81 EARLY SATIETY: ICD-10-CM

## 2025-04-04 DIAGNOSIS — R12 HEARTBURN: ICD-10-CM

## 2025-04-04 DIAGNOSIS — R63.4 WEIGHT LOSS: ICD-10-CM

## 2025-04-04 DIAGNOSIS — R11.2 NAUSEA AND VOMITING, UNSPECIFIED VOMITING TYPE: ICD-10-CM

## 2025-04-04 DIAGNOSIS — R19.4 CHANGE IN BOWEL HABITS: ICD-10-CM

## 2025-04-04 DIAGNOSIS — R10.10 PAIN OF UPPER ABDOMEN: Primary | ICD-10-CM

## 2025-04-04 PROCEDURE — 99214 OFFICE O/P EST MOD 30 MIN: CPT | Performed by: NURSE PRACTITIONER

## 2025-04-04 RX ORDER — PANTOPRAZOLE SODIUM 40 MG/1
40 TABLET, DELAYED RELEASE ORAL 2 TIMES DAILY
Qty: 180 TABLET | Refills: 3 | Status: SHIPPED | OUTPATIENT
Start: 2025-04-04

## 2025-04-04 RX ORDER — DICYCLOMINE HCL 20 MG
20 TABLET ORAL EVERY 6 HOURS
COMMUNITY
Start: 2025-03-18

## 2025-04-04 RX ORDER — CYCLOBENZAPRINE HCL 10 MG
TABLET ORAL
COMMUNITY
Start: 2025-04-01

## 2025-04-04 NOTE — H&P (VIEW-ONLY)
Chief Complaint   Patient presents with    Abdominal Pain       HPI    Marii Schuster is a  60 y.o. female here for a follow up visit for abdominal pain.    This patient follows with Dr. Ugalde, new to me.    Past medical history of lap jose, diabetes, fatty liver disease, thyroid disease, hyperlipidemia, hypertension, kidney stones along with obesity.    Patient last seen by our office roughly 1 year ago for elevated liver function tests.  Subsequent elastography showed no evidence of fibrosis with mild fatty liver/steatosis.  She has been counseled on weight loss, healthy eating habits and increase physical activity as such.    She was seen in the emergency room earlier this month ([facility]) for complaints of upper abdominal pain radiating to her back. CT abdomen and pelvis with contrast performed with nonspecific adenopathy at the eduard hepatis, wall thickening of the urinary bladder and right ovarian cyst.  UA was negative.  Lab work with normal white blood cell count.  Normal hemoglobin and lipase with mildly elevated bilirubin (1.6) and AST (43).  She was discharged on Bentyl and told to follow-up with our services.    On visit today she is accompanied by her  who gives collateral medical information.  She reports onset of abdominal pain around December 2024.  Pain localizes at the upper abdominal region and can radiate like a band across her abdomen.  She complains of low back pain as well.  She reports increase in dyspeptic symptoms and nausea with a couple of episodes of vomiting.  Reports excessive belching.  No dysphagia or odynophagia.  Appetite has been poor.  Frequent early satiety.  10 pound weight loss.  She is currently on omeprazole 40 mg once daily.  She has been on this particular PPI for at least 5 years.    She also complains of change in bowel habits during the same timeframe.  She is now having a bowel movement twice a week.  Reports incomplete evacuation.  No rectal  pain or rectal bleeding.  She recently started a bland diet and MiraLAX twice a day with minimal improvement.  She took Bentyl following ER visit but did not find it all that helpful.    Endoscopic history reviewed:    EGD 2021 small hiatal hernia and gastritis.    C-scope 2019 normal.     Past Medical History:   Diagnosis Date    Diabetes mellitus     Fatty liver     GERD (gastroesophageal reflux disease)     Hashimoto's disease     Hernia     hiatal hernia    Hyperlipidemia     Hypertension     Kidney stones     Sleep apnea 2011?       Past Surgical History:   Procedure Laterality Date    CARDIAC CATHETERIZATION N/A 10/20/2023    Procedure: Left Heart Cath;  Surgeon: Severiano Loya MD;  Location:  [facility] CATH INVASIVE LOCATION;  Service: Cardiology;  Laterality: N/A;    CARDIAC CATHETERIZATION N/A 10/20/2023    Procedure: Coronary angiography;  Surgeon: Severiano Loya MD;  Location:  [facility] CATH INVASIVE LOCATION;  Service: Cardiology;  Laterality: N/A;    CARDIAC CATHETERIZATION N/A 10/20/2023    Procedure: Left ventriculography;  Surgeon: Severiano Loya MD;  Location:  [facility] CATH INVASIVE LOCATION;  Service: Cardiology;  Laterality: N/A;    CHOLECYSTECTOMY      COLONOSCOPY N/A 01/28/2019    Normal, recall 10 years.    CYSTOSCOPY W/ LASER LITHOTRIPSY      DILATATION AND CURETTAGE      UPPER GASTROINTESTINAL ENDOSCOPY N/A 10/25/2021    Small hiatal hernia, mild gastritis       Scheduled Meds:     Continuous Infusions: No current facility-administered medications for this visit.      PRN Meds:     Allergies   Allergen Reactions    Egg-Derived Products Hives    Codeine Nausea And Vomiting and Nausea Only       Social History     Socioeconomic History    Marital status:    Tobacco Use    Smoking status: Former     Current packs/day: 0.00     Average packs/day: 1 pack/day for 4.0 years (4.0 ttl pk-yrs)     Types: Cigarettes     Start date: 1987     Quit date: 1991     Years since quitting:  34.2    Smokeless tobacco: Never   Vaping Use    Vaping status: Never Used   Substance and Sexual Activity    Alcohol use: Not Currently     Comment: caffeine use- coffee    Drug use: Not Currently    Sexual activity: Yes     Partners: Male       Family History   Problem Relation Age of Onset    Hypertension Mother     Diabetes Mother     Colon cancer Mother     Heart attack Father         Passed 1994    Heart disease Sister         stents    Hypertension Brother         cabg    Diabetes Brother     Hypertension Brother         cabg    Heart disease Brother     Hypertension Brother     Heart disease Brother     Hypertension Brother     Heart disease Brother     Heart disease Sister     Heart disease Sister     Breast cancer Neg Hx        Review of Systems   Gastrointestinal:  Positive for abdominal pain, constipation, nausea and vomiting.       Vitals:    04/04/25 1057   BP: 117/75   Pulse: 98   Temp: 97.2 °F (36.2 °C)       Physical Exam  Constitutional:       Appearance: She is well-developed.   Abdominal:      General: Bowel sounds are normal. There is no distension.      Palpations: Abdomen is soft. There is no mass.      Tenderness: There is abdominal tenderness. There is no guarding.      Hernia: No hernia is present.   Skin:     General: Skin is warm and dry.      Capillary Refill: Capillary refill takes less than 2 seconds.   Neurological:      Mental Status: She is alert and oriented to person, place, and time.   Psychiatric:         Behavior: Behavior normal.     Assessment    Diagnoses and all orders for this visit:    1. Pain of upper abdomen (Primary)    2. Nausea and vomiting, unspecified vomiting type    3. Heartburn    4. Early satiety    5. Change in bowel habits    6. Weight loss    Other orders  -     linaclotide (Linzess) 145 MCG capsule capsule; Take 1 capsule by mouth Every Morning Before Breakfast.  Dispense: 90 capsule; Refill: 3  -     pantoprazole (PROTONIX) 40 MG EC tablet; Take 1 tablet  by mouth 2 (Two) Times a Day.  Dispense: 180 tablet; Refill: 3    Plan    Schedule EGD and colonoscopy with Dr. Ugalde for further evaluation of symptoms  Recommend GoLytely prep and begin low residue diet now as patient was able to schedule for April 10  Start Linzess medium strength as above to treat constipation  Stop MiraLAX has not been helping  Stop omeprazole  Begin Protonix 40 mg p.o. twice daily  Recommend she follow-up with her primary care provider for further evaluation of low back pain and her OB/GYN for ovarian cyst  Await endoscopic findings for further recommendations and follow-up         DAMIEN Hall  [facility]  [address] [place] [address]  Office: [phone]

## 2025-04-04 NOTE — PROGRESS NOTES
Chief Complaint   Patient presents with    Abdominal Pain       HPI    Marii Schuster is a  60 y.o. female here for a follow up visit for abdominal pain.    This patient follows with Dr. Ugalde, new to me.    Past medical history of lap jose, diabetes, fatty liver disease, thyroid disease, hyperlipidemia, hypertension, kidney stones along with obesity.    Patient last seen by our office roughly 1 year ago for elevated liver function tests.  Subsequent elastography showed no evidence of fibrosis with mild fatty liver/steatosis.  She has been counseled on weight loss, healthy eating habits and increase physical activity as such.    She was seen in the emergency room earlier this month ([facility]) for complaints of upper abdominal pain radiating to her back. CT abdomen and pelvis with contrast performed with nonspecific adenopathy at the eduard hepatis, wall thickening of the urinary bladder and right ovarian cyst.  UA was negative.  Lab work with normal white blood cell count.  Normal hemoglobin and lipase with mildly elevated bilirubin (1.6) and AST (43).  She was discharged on Bentyl and told to follow-up with our services.    On visit today she is accompanied by her  who gives collateral medical information.  She reports onset of abdominal pain around December 2024.  Pain localizes at the upper abdominal region and can radiate like a band across her abdomen.  She complains of low back pain as well.  She reports increase in dyspeptic symptoms and nausea with a couple of episodes of vomiting.  Reports excessive belching.  No dysphagia or odynophagia.  Appetite has been poor.  Frequent early satiety.  10 pound weight loss.  She is currently on omeprazole 40 mg once daily.  She has been on this particular PPI for at least 5 years.    She also complains of change in bowel habits during the same timeframe.  She is now having a bowel movement twice a week.  Reports incomplete evacuation.  No rectal  pain or rectal bleeding.  She recently started a bland diet and MiraLAX twice a day with minimal improvement.  She took Bentyl following ER visit but did not find it all that helpful.    Endoscopic history reviewed:    EGD 2021 small hiatal hernia and gastritis.    C-scope 2019 normal.     Past Medical History:   Diagnosis Date    Diabetes mellitus     Fatty liver     GERD (gastroesophageal reflux disease)     Hashimoto's disease     Hernia     hiatal hernia    Hyperlipidemia     Hypertension     Kidney stones     Sleep apnea 2011?       Past Surgical History:   Procedure Laterality Date    CARDIAC CATHETERIZATION N/A 10/20/2023    Procedure: Left Heart Cath;  Surgeon: Severiano Loya MD;  Location:  [facility];  Service: Cardiology;  Laterality: N/A;    CARDIAC CATHETERIZATION N/A 10/20/2023    Procedure: Coronary angiography;  Surgeon: Severiano Loya MD;  Location:  [facility];  Service: Cardiology;  Laterality: N/A;    CARDIAC CATHETERIZATION N/A 10/20/2023    Procedure: Left ventriculography;  Surgeon: Severiano Loya MD;  Location:  [facility];  Service: Cardiology;  Laterality: N/A;    CHOLECYSTECTOMY      COLONOSCOPY N/A 01/28/2019    Normal, recall 10 years.    CYSTOSCOPY W/ LASER LITHOTRIPSY      DILATATION AND CURETTAGE      UPPER GASTROINTESTINAL ENDOSCOPY N/A 10/25/2021    Small hiatal hernia, mild gastritis       Scheduled Meds:     Continuous Infusions: No current facility-administered medications for this visit.      PRN Meds:     Allergies   Allergen Reactions    Egg-Derived Products Hives    Codeine Nausea And Vomiting and Nausea Only       Social History     Socioeconomic History    Marital status:    Tobacco Use    Smoking status: Former     Current packs/day: 0.00     Average packs/day: 1 pack/day for 4.0 years (4.0 ttl pk-yrs)     Types: Cigarettes     Start date: 1987     Quit date: 1991     Years since quitting:  34.2    Smokeless tobacco: Never   Vaping Use    Vaping status: Never Used   Substance and Sexual Activity    Alcohol use: Not Currently     Comment: caffeine use- coffee    Drug use: Not Currently    Sexual activity: Yes     Partners: Male       Family History   Problem Relation Age of Onset    Hypertension Mother     Diabetes Mother     Colon cancer Mother     Heart attack Father         Passed 1994    Heart disease Sister         stents    Hypertension Brother         cabg    Diabetes Brother     Hypertension Brother         cabg    Heart disease Brother     Hypertension Brother     Heart disease Brother     Hypertension Brother     Heart disease Brother     Heart disease Sister     Heart disease Sister     Breast cancer Neg Hx        Review of Systems   Gastrointestinal:  Positive for abdominal pain, constipation, nausea and vomiting.       Vitals:    04/04/25 1057   BP: 117/75   Pulse: 98   Temp: 97.2 °F (36.2 °C)       Physical Exam  Constitutional:       Appearance: She is well-developed.   Abdominal:      General: Bowel sounds are normal. There is no distension.      Palpations: Abdomen is soft. There is no mass.      Tenderness: There is abdominal tenderness. There is no guarding.      Hernia: No hernia is present.   Skin:     General: Skin is warm and dry.      Capillary Refill: Capillary refill takes less than 2 seconds.   Neurological:      Mental Status: She is alert and oriented to person, place, and time.   Psychiatric:         Behavior: Behavior normal.     Assessment    Diagnoses and all orders for this visit:    1. Pain of upper abdomen (Primary)    2. Nausea and vomiting, unspecified vomiting type    3. Heartburn    4. Early satiety    5. Change in bowel habits    6. Weight loss    Other orders  -     linaclotide (Linzess) 145 MCG capsule capsule; Take 1 capsule by mouth Every Morning Before Breakfast.  Dispense: 90 capsule; Refill: 3  -     pantoprazole (PROTONIX) 40 MG EC tablet; Take 1 tablet  by mouth 2 (Two) Times a Day.  Dispense: 180 tablet; Refill: 3    Plan    Schedule EGD and colonoscopy with Dr. Ugalde for further evaluation of symptoms  Recommend GoLytely prep and begin low residue diet now as patient was able to schedule for April 10  Start Linzess medium strength as above to treat constipation  Stop MiraLAX has not been helping  Stop omeprazole  Begin Protonix 40 mg p.o. twice daily  Recommend she follow-up with her primary care provider for further evaluation of low back pain and her OB/GYN for ovarian cyst  Await endoscopic findings for further recommendations and follow-up         DAMIEN Hall  [facility]  [address] [place] [address]  Office: [phone]

## 2025-04-04 NOTE — TELEPHONE ENCOUNTER
ROGELIO Prescott for COLONOSCOPY on 4/10/25  arrive at 9:30  . Gave prep instructions to pt in office ....Jaquanly prep faxed to pharmacy and handed pt Low residue sheet per to Dominga watson

## 2025-04-09 RX ORDER — HYDROCODONE BITARTRATE AND ACETAMINOPHEN 5; 325 MG/1; MG/1
1 TABLET ORAL NIGHTLY
COMMUNITY

## 2025-04-10 ENCOUNTER — ANESTHESIA EVENT (OUTPATIENT)
Dept: GASTROENTEROLOGY | Facility: HOSPITAL | Age: 60
End: 2025-04-10
Payer: COMMERCIAL

## 2025-04-10 ENCOUNTER — HOSPITAL ENCOUNTER (OUTPATIENT)
Facility: HOSPITAL | Age: 60
Setting detail: HOSPITAL OUTPATIENT SURGERY
Discharge: HOME | End: 2025-04-10
Attending: INTERNAL MEDICINE | Admitting: INTERNAL MEDICINE
Payer: COMMERCIAL

## 2025-04-10 ENCOUNTER — ANESTHESIA (OUTPATIENT)
Dept: GASTROENTEROLOGY | Facility: HOSPITAL | Age: 60
End: 2025-04-10
Payer: COMMERCIAL

## 2025-04-10 VITALS
HEART RATE: 73 BPM | WEIGHT: 209 LBS | HEIGHT: 64 IN | SYSTOLIC BLOOD PRESSURE: 125 MMHG | OXYGEN SATURATION: 94 % | BODY MASS INDEX: 35.68 KG/M2 | DIASTOLIC BLOOD PRESSURE: 71 MMHG | RESPIRATION RATE: 21 BRPM

## 2025-04-10 DIAGNOSIS — R19.4 CHANGE IN BOWEL HABITS: ICD-10-CM

## 2025-04-10 DIAGNOSIS — R10.10 PAIN OF UPPER ABDOMEN: ICD-10-CM

## 2025-04-10 DIAGNOSIS — R11.2 NAUSEA AND VOMITING, UNSPECIFIED VOMITING TYPE: ICD-10-CM

## 2025-04-10 DIAGNOSIS — R68.81 EARLY SATIETY: ICD-10-CM

## 2025-04-10 DIAGNOSIS — R63.4 WEIGHT LOSS: ICD-10-CM

## 2025-04-10 DIAGNOSIS — R12 HEARTBURN: ICD-10-CM

## 2025-04-10 PROCEDURE — 43239 EGD BIOPSY SINGLE/MULTIPLE: CPT | Performed by: INTERNAL MEDICINE

## 2025-04-10 PROCEDURE — 45385 COLONOSCOPY W/LESION REMOVAL: CPT | Performed by: INTERNAL MEDICINE

## 2025-04-10 PROCEDURE — 25810000003 LACTATED RINGERS PER 1000 ML: Performed by: INTERNAL MEDICINE

## 2025-04-10 PROCEDURE — 88305 TISSUE EXAM BY PATHOLOGIST: CPT | Performed by: INTERNAL MEDICINE

## 2025-04-10 PROCEDURE — 25010000002 PROPOFOL 10 MG/ML EMULSION: Performed by: NURSE ANESTHETIST, CERTIFIED REGISTERED

## 2025-04-10 PROCEDURE — 25010000002 LIDOCAINE 2% SOLUTION: Performed by: NURSE ANESTHETIST, CERTIFIED REGISTERED

## 2025-04-10 RX ORDER — PROPOFOL 10 MG/ML
VIAL (ML) INTRAVENOUS AS NEEDED
Status: DISCONTINUED | OUTPATIENT
Start: 2025-04-10 | End: 2025-04-10 | Stop reason: SURG

## 2025-04-10 RX ORDER — SODIUM CHLORIDE 0.9 % (FLUSH) 0.9 %
10 SYRINGE (ML) INJECTION AS NEEDED
Status: DISCONTINUED | OUTPATIENT
Start: 2025-04-10 | End: 2025-04-10 | Stop reason: HOSPADM

## 2025-04-10 RX ORDER — LIDOCAINE HYDROCHLORIDE 10 MG/ML
0.5 INJECTION, SOLUTION INFILTRATION; PERINEURAL ONCE AS NEEDED
Status: DISCONTINUED | OUTPATIENT
Start: 2025-04-10 | End: 2025-04-10 | Stop reason: HOSPADM

## 2025-04-10 RX ORDER — SODIUM CHLORIDE, SODIUM LACTATE, POTASSIUM CHLORIDE, CALCIUM CHLORIDE 600; 310; 30; 20 MG/100ML; MG/100ML; MG/100ML; MG/100ML
1000 INJECTION, SOLUTION INTRAVENOUS CONTINUOUS
Status: DISCONTINUED | OUTPATIENT
Start: 2025-04-10 | End: 2025-04-10 | Stop reason: HOSPADM

## 2025-04-10 RX ORDER — PROPOFOL 10 MG/ML
VIAL (ML) INTRAVENOUS CONTINUOUS PRN
Status: DISCONTINUED | OUTPATIENT
Start: 2025-04-10 | End: 2025-04-10

## 2025-04-10 RX ORDER — HYDROCORTISONE ACETATE PRAMOXINE HCL 2.5; 1 G/100G; G/100G
CREAM TOPICAL 3 TIMES DAILY
Qty: 15 G | Refills: 1 | Status: SHIPPED | OUTPATIENT
Start: 2025-04-10

## 2025-04-10 RX ORDER — LIDOCAINE HYDROCHLORIDE 20 MG/ML
INJECTION, SOLUTION INFILTRATION; PERINEURAL AS NEEDED
Status: DISCONTINUED | OUTPATIENT
Start: 2025-04-10 | End: 2025-04-10 | Stop reason: SURG

## 2025-04-10 RX ADMIN — LIDOCAINE HYDROCHLORIDE 60 MG: 20 INJECTION, SOLUTION INFILTRATION; PERINEURAL at 10:23

## 2025-04-10 RX ADMIN — PROPOFOL 100 MG: 10 INJECTION, EMULSION INTRAVENOUS at 10:23

## 2025-04-10 RX ADMIN — SODIUM CHLORIDE, POTASSIUM CHLORIDE, SODIUM LACTATE AND CALCIUM CHLORIDE 1000 ML: 600; 310; 30; 20 INJECTION, SOLUTION INTRAVENOUS at 10:17

## 2025-04-10 RX ADMIN — PROPOFOL 200 MCG/KG/MIN: 10 INJECTION, EMULSION INTRAVENOUS at 10:23

## 2025-04-10 NOTE — ANESTHESIA PREPROCEDURE EVALUATION
Anesthesia Evaluation     history of anesthetic complications:  PONV  NPO Solid Status: > 8 hours  NPO Liquid Status: > 2 hours           Airway   Mallampati: III  TM distance: <3 FB  Anterior, Large neck circumference and Difficult intubation highly probable  Dental - normal exam     Pulmonary    (+) ,sleep apnea on CPAP  Cardiovascular   Exercise tolerance: good (4-7 METS)    Patient on routine beta blocker and Beta blocker given within 24 hours of surgery  Rhythm: regular  Rate: normal    (+) hypertension 2 medications or greater, angina, hyperlipidemia      Neuro/Psych- negative ROS  GI/Hepatic/Renal/Endo    (+) morbid obesity, GERD, liver disease fatty liver disease, renal disease- stones, diabetes mellitus type 2, thyroid problem     Musculoskeletal     Abdominal    Substance History      OB/GYN          Other                          Anesthesia Plan    ASA 3     MAC     intravenous induction     Anesthetic plan, risks, benefits, and alternatives have been provided, discussed and informed consent has been obtained with: patient.        CODE STATUS:

## 2025-04-10 NOTE — ANESTHESIA POSTPROCEDURE EVALUATION
"Patient: Marii Schuster    Procedure Summary       Date: 04/10/25 Room / Location: [facility] ENDOSCOPY 10 /  [facility]    Anesthesia Start: 1020 Anesthesia Stop: 1045    Procedures:       COLONOSCOPY TO CECUM/TI WITH POLYPECTOMY ( COLD SNARE)      ESOPHAGOGASTRODUODENOSCOPY WITH BIOPSY (Esophagus) Diagnosis:       Pain of upper abdomen      Change in bowel habits      Weight loss      Early satiety      Heartburn      Nausea and vomiting, unspecified vomiting type      (Pain of upper abdomen [R10.10])      (Change in bowel habits [R19.4])      (Weight loss [R63.4])      (Early satiety [R68.81])      (Heartburn [R12])      (Nausea and vomiting, unspecified vomiting type [R11.2])    Surgeons: Jorgito Ugalde MD Provider: Naty Ornelas MD    Anesthesia Type: MAC ASA Status: 3            Anesthesia Type: MAC    Vitals  Vitals Value Taken Time   /71 04/10/25 11:08   Temp     Pulse 72 04/10/25 11:12   Resp 21 04/10/25 11:07   SpO2 96 % 04/10/25 11:12   Vitals shown include unfiled device data.        Post Anesthesia Care and Evaluation    Patient location during evaluation: bedside  Patient participation: complete - patient participated  Level of consciousness: awake  Pain management: adequate    Airway patency: patent  Anesthetic complications: No anesthetic complications    Cardiovascular status: acceptable  Respiratory status: acceptable  Hydration status: acceptable    Comments: /71 (BP Location: Left arm, Patient Position: Sitting)   Pulse 73   Resp 21   Ht 162.6 cm (64\")   Wt 94.8 kg (209 lb)   LMP 03/26/2025 (Approximate)   SpO2 94%   BMI 35.87 kg/m²     "

## 2025-04-10 NOTE — DISCHARGE INSTRUCTIONS
For the next 24 hours patient needs to be with a responsible adult.    For 24 hours DO NOT drive, operate machinery, appliances, drink alcohol, make important decisions or sign legal documents.    Start with a light or bland diet if you are feeling sick to your stomach otherwise advance to regular diet as tolerated.    Follow recommendations on procedure report if provided by your doctor.    Call Dr Ugalde for problems [phone]    Problems may include but not limited to: large amounts of bleeding, trouble breathing, repeated vomiting, severe unrelieved pain, fever or chills.

## 2025-04-11 LAB
CYTO UR: NORMAL
LAB AP CASE REPORT: NORMAL
PATH REPORT.FINAL DX SPEC: NORMAL
PATH REPORT.GROSS SPEC: NORMAL

## 2025-04-21 ENCOUNTER — TRANSCRIBE ORDERS (OUTPATIENT)
Dept: ADMINISTRATIVE | Facility: HOSPITAL | Age: 60
End: 2025-04-21
Payer: COMMERCIAL

## 2025-04-21 ENCOUNTER — LAB (OUTPATIENT)
Dept: LAB | Facility: HOSPITAL | Age: 60
End: 2025-04-21
Payer: COMMERCIAL

## 2025-04-21 DIAGNOSIS — R14.0 GASTRIC TYMPANY: ICD-10-CM

## 2025-04-21 DIAGNOSIS — Z00.00 ROUTINE GENERAL MEDICAL EXAMINATION AT A HEALTH CARE FACILITY: Primary | ICD-10-CM

## 2025-04-21 DIAGNOSIS — Z00.00 ROUTINE GENERAL MEDICAL EXAMINATION AT A HEALTH CARE FACILITY: ICD-10-CM

## 2025-04-21 LAB
LH SERPL-ACNC: 38.3 MIU/ML
PROLACTIN SERPL-MCNC: 34.2 NG/ML (ref 4.79–23.3)

## 2025-04-21 PROCEDURE — 86258 DGP ANTIBODY EACH IG CLASS: CPT

## 2025-04-21 PROCEDURE — 84270 ASSAY OF SEX HORMONE GLOBUL: CPT

## 2025-04-21 PROCEDURE — 86364 TISS TRNSGLTMNASE EA IG CLAS: CPT

## 2025-04-21 PROCEDURE — 82784 ASSAY IGA/IGD/IGG/IGM EACH: CPT

## 2025-04-21 PROCEDURE — 36415 COLL VENOUS BLD VENIPUNCTURE: CPT

## 2025-04-21 PROCEDURE — 86231 EMA EACH IG CLASS: CPT

## 2025-04-21 PROCEDURE — 84146 ASSAY OF PROLACTIN: CPT

## 2025-04-21 PROCEDURE — 83002 ASSAY OF GONADOTROPIN (LH): CPT

## 2025-04-22 LAB
ENDOMYSIUM IGA SER QL: NEGATIVE
GLIADIN PEPTIDE IGA SER-ACNC: 5 UNITS (ref 0–19)
GLIADIN PEPTIDE IGG SER-ACNC: 2 UNITS (ref 0–19)
IGA SERPL-MCNC: 383 MG/DL (ref 87–352)
SHBG SERPL-SCNC: 145 NMOL/L (ref 17.3–125)
TTG IGA SER-ACNC: <2 U/ML (ref 0–3)
TTG IGG SER-ACNC: 3 U/ML (ref 0–5)

## 2025-06-14 PROBLEM — N20.0 NEPHROLITHIASIS: Status: ACTIVE | Noted: 2025-06-14

## 2025-06-14 NOTE — PROGRESS NOTES
Chief Complaint: Urologic complaint    Subjective         History of Present Illness  Marii Schuster is a 60 y.o. female     Nephrolithiasis  Bladder wall thick      5/27/2025 MRI pelvis with and without - hepatic steatosis, stable abdominal lymph nodes with largest eduard hepatis node measuring 1.4 cm.      3/18/2025 CT abdomen/pelvis with - [facility] - nonspecific adenopathy eduard hepatis.  Wall thickening of the urinary bladder.  3.2 cm right ovarian cyst.    3/25 0.4,   2/25 CT abdomen/pelvis without - slightly thickened urinary bladder wall.  Nodular enlargement of the medial limb of the left adrenal gland stable.  Some small bilateral nonobstructing stones largest on each side is 3 mm.  Images reviewed    2021 NP Mancini-urethral prolapse and meatal stenosis declined urethral dilation      No GH/ dysuria    Lithotripsy for nephrolithiasis x 3    No urologic family history    No cardiopulmonary history  Non-smoker  No anticoagulation          Objective     Past Medical History:   Diagnosis Date    Abdominal pain     RADIATING TO BACK    Diabetes mellitus     Fatty liver     GERD (gastroesophageal reflux disease)     Hashimoto's disease     Hernia     hiatal hernia    Hyperlipidemia     Hypertension     Kidney stones     PONV (postoperative nausea and vomiting)     Sleep apnea 2011?    WEARS CPAP                   Assessment and Plan    Diagnoses and all orders for this visit:    1. Nephrolithiasis (Primary)      CT images and read reviewed and discussed with the patient.  Patient only has a few small stones, no surgical indications currently     The patient was counseled on the preventative measures of kidney stones today.  This included increasing fluid intake to make at least 1.5 ml daily, decreasing meat intake, decreasing salt intake and taking in a normal amount of calcium (1000 mg daily).  Information handout given on this today.     We also discussed the DASH diet today for stone prevention and  handout was given            Bladder wall thickening      I did recommend patient come in for cystoscopy to rule out underlying pathology which could be detrimental to her health or cause death -  patient with understanding we will get her set up

## 2025-06-17 ENCOUNTER — OFFICE VISIT (OUTPATIENT)
Dept: UROLOGY | Age: 60
End: 2025-06-17
Payer: MEDICAID

## 2025-06-17 VITALS — HEIGHT: 64 IN | BODY MASS INDEX: 35.68 KG/M2 | WEIGHT: 209 LBS

## 2025-06-17 DIAGNOSIS — N20.0 NEPHROLITHIASIS: Primary | ICD-10-CM

## 2025-07-07 ENCOUNTER — TELEPHONE (OUTPATIENT)
Dept: GASTROENTEROLOGY | Facility: CLINIC | Age: 60
End: 2025-07-07
Payer: MEDICAID

## 2025-07-07 NOTE — TELEPHONE ENCOUNTER
Caller: Marii Schuster    Relationship to patient: Self      Best call back number: [phone]    Provider: MARLEN GARCIA    Medication PA needed: JANET    Reason for call/Prior Auth: PHARMACY NEEDS AUTHORIZATION TO REFILL MEDICATION. VERIFIED PHARMACY [facility] [id number] [place], [place] [address] [facility] [phone]  - [phone] FX [[id number]].  IF NEED TO CONTACT PATIENT, OKAY TO CALL ANYTIME, OKAY TO LEAVE .

## 2025-07-08 NOTE — TELEPHONE ENCOUNTER
Marii Schuster (Key: [id number])  Need Help? Call us at [phone]  Status  sent iconSent to Plan today  Next Steps  The plan will fax you a determination, typically within 1 to 5 business days.  Drug  Linzess 145MCG capsules

## 2025-07-11 NOTE — TELEPHONE ENCOUNTER
Called [facility] to check on the status of the PA for Cindy.    Spoke to Filiberto PARNELL.     Request is still pending.     Answered the question needed over the phone.     It will be 24-72 hour. We just updated the PA. Filiberto is sending this. We should receive the reply through fax.    PA#[id number]

## 2025-08-08 ENCOUNTER — OFFICE VISIT (OUTPATIENT)
Dept: GASTROENTEROLOGY | Facility: CLINIC | Age: 60
End: 2025-08-08
Payer: MEDICAID

## 2025-08-08 VITALS
SYSTOLIC BLOOD PRESSURE: 114 MMHG | HEART RATE: 73 BPM | HEIGHT: 64 IN | BODY MASS INDEX: 33.49 KG/M2 | TEMPERATURE: 97.7 F | WEIGHT: 196.2 LBS | DIASTOLIC BLOOD PRESSURE: 73 MMHG

## 2025-08-08 DIAGNOSIS — R63.0 POOR APPETITE: ICD-10-CM

## 2025-08-08 DIAGNOSIS — K58.1 IRRITABLE BOWEL SYNDROME WITH CONSTIPATION: ICD-10-CM

## 2025-08-08 DIAGNOSIS — R68.81 EARLY SATIETY: ICD-10-CM

## 2025-08-08 DIAGNOSIS — R10.84 GENERALIZED ABDOMINAL PAIN: Primary | ICD-10-CM

## 2025-08-08 DIAGNOSIS — R11.0 NAUSEA: ICD-10-CM

## 2025-08-08 DIAGNOSIS — K21.9 GASTROESOPHAGEAL REFLUX DISEASE, UNSPECIFIED WHETHER ESOPHAGITIS PRESENT: ICD-10-CM

## 2025-08-08 RX ORDER — PANTOPRAZOLE SODIUM 40 MG/1
40 TABLET, DELAYED RELEASE ORAL DAILY
Start: 2025-08-08

## 2025-08-15 ENCOUNTER — TELEPHONE (OUTPATIENT)
Dept: GASTROENTEROLOGY | Facility: CLINIC | Age: 60
End: 2025-08-15
Payer: MEDICAID

## 2025-08-18 DIAGNOSIS — R10.84 GENERALIZED ABDOMINAL PAIN: Primary | ICD-10-CM

## 2025-08-18 RX ORDER — NYSTATIN 100000 [USP'U]/ML
500000 SUSPENSION ORAL 4 TIMES DAILY
Qty: 140 ML | Refills: 0 | Status: SHIPPED | OUTPATIENT
Start: 2025-08-18 | End: 2025-08-25

## 2025-08-22 ENCOUNTER — HOSPITAL ENCOUNTER (OUTPATIENT)
Dept: CARDIOLOGY | Facility: HOSPITAL | Age: 60
Discharge: HOME | End: 2025-08-22
Payer: MEDICAID

## 2025-08-22 LAB
BH CV VAS SMA AORTA PSV: 100 CM/S
BH CV VAS SMA CELIAC ORIGIN EDV: 48 CM/S
BH CV VAS SMA CELIAC ORIGIN PSV: 219 CM/S
BH CV VAS SMA CELIAC PROX EDV: 64 CM/S
BH CV VAS SMA CELIAC PROX PSV: 210 CM/S
BH CV VAS SMA HEPATIC EDV: 29 CM/S
BH CV VAS SMA HEPATIC PSV: 140 CM/S
BH CV VAS SMA ORIGIN EDV: 42 CM/S
BH CV VAS SMA ORIGIN PSV: 228 CM/S
BH CV VAS SMA SMA MID EDV: 36 CM/S
BH CV VAS SMA SMA MID PSV: 161 CM/S
BH CV VAS SMA SMA PROX EDV: 28 CM/S
BH CV VAS SMA SMA PROX PSV: 125 CM/S
BH CV VAS SMA SPLENIC EDV: 39 CM/S
BH CV VAS SMA SPLENIC PSV: 112 CM/S

## 2025-08-22 PROCEDURE — 93975 VASCULAR STUDY: CPT

## 2025-08-25 DIAGNOSIS — K55.1 MESENTERIC ARTERY STENOSIS: ICD-10-CM

## 2025-08-25 DIAGNOSIS — R10.84 GENERALIZED ABDOMINAL PAIN: Primary | ICD-10-CM

## 2025-08-27 ENCOUNTER — OFFICE VISIT (OUTPATIENT)
Age: 60
End: 2025-08-27
Payer: MEDICAID

## 2025-08-27 VITALS
BODY MASS INDEX: 33.46 KG/M2 | HEIGHT: 64 IN | HEART RATE: 71 BPM | WEIGHT: 196 LBS | SYSTOLIC BLOOD PRESSURE: 129 MMHG | DIASTOLIC BLOOD PRESSURE: 83 MMHG

## 2025-08-27 DIAGNOSIS — I77.4 CELIAC ARTERY STENOSIS: Primary | ICD-10-CM

## 2025-08-27 DIAGNOSIS — M54.14 RADICULAR PAIN OF THORACIC REGION: ICD-10-CM

## 2025-08-27 RX ORDER — PROGESTERONE 200 MG/1
200 CAPSULE ORAL DAILY
COMMUNITY

## 2025-08-30 PROBLEM — N32.89 BLADDER WALL THICKENING: Status: ACTIVE | Noted: 2025-08-30

## (undated) DEVICE — LN SMPL CO2 SHTRM SD STREAM W/M LUER

## (undated) DEVICE — SENSR O2 OXIMAX FNGR A/ 18IN NONSTR

## (undated) DEVICE — ADAPT CLN BIOGUARD AIR/H2O DISP

## (undated) DEVICE — CANN O2 ETCO2 FITS ALL CONN CO2 SMPL A/ 7IN DISP LF

## (undated) DEVICE — KT MANIFLD CARDIAC

## (undated) DEVICE — PK CATH CARD 40

## (undated) DEVICE — GW EMR FIX EXCHG J STD .035 3MM 260CM

## (undated) DEVICE — KT ORCA ORCAPOD DISP STRL

## (undated) DEVICE — BLCK/BITE BLOX W/DENTL/RIM W/STRAP 54F

## (undated) DEVICE — TUBING, SUCTION, 1/4" X 10', STRAIGHT: Brand: MEDLINE

## (undated) DEVICE — TR BAND RADIAL ARTERY COMPRESSION DEVICE: Brand: TR BAND

## (undated) DEVICE — CATH DIAG IMPULSE FL3.5 5F 100CM

## (undated) DEVICE — CANNULA,OXY,ADULT,SUPER SOFT,W/14'TUB,UC: Brand: MEDLINE INDUSTRIES, INC.

## (undated) DEVICE — CATH DIAG IMPULSE PIG 5F 100CM

## (undated) DEVICE — THE SINGLE USE ETRAP – POLYP TRAP IS USED FOR SUCTION RETRIEVAL OF ENDOSCOPICALLY REMOVED POLYPS.: Brand: ETRAP

## (undated) DEVICE — CATH DIAG IMPULSE FR5 5F 100CM

## (undated) DEVICE — LASSO POLYPECTOMY SNARE: Brand: LASSO

## (undated) DEVICE — FRCP BX RADJAW4 NDL 2.8 240CM LG OG BX40

## (undated) DEVICE — SNAR POLYP CAPTIVATOR RND STFF 2.4 240CM 10MM 1P/U

## (undated) DEVICE — GLIDESHEATH SLENDER STAINLESS STEEL KIT: Brand: GLIDESHEATH SLENDER